# Patient Record
Sex: FEMALE | Race: WHITE | Employment: FULL TIME
[De-identification: names, ages, dates, MRNs, and addresses within clinical notes are randomized per-mention and may not be internally consistent; named-entity substitution may affect disease eponyms.]

---

## 2020-07-09 ENCOUNTER — TRANSCRIBE ORDERS (OUTPATIENT)
Dept: OTHER | Age: 60
End: 2020-07-09

## 2020-07-09 DIAGNOSIS — N94.89 ADNEXAL MASS: Primary | ICD-10-CM

## 2020-07-09 NOTE — TELEPHONE ENCOUNTER
ONCOLOGY INTAKE: Records Information      APPT INFORMATION:  Referring provider:  Dr.Kristen Sunni HAMLIN  Referring provider s clinic:  [facility]   Reason for visit/diagnosis:  adnexal mass  Has patient been notified of appointment date and time?: Yes    RECORDS INFORMATION:  Were the records received with the referral (via [employer])? Yes    Has patient been seen for any external appt for this diagnosis? Yes    If yes, where?  [facility]       Has patient had any imaging or procedures outside of [place] for this condition? Yes      If Yes, where?  [facility]       ADDITIONAL INFORMATION:  Patient is unable to do a video visit and will come in person.

## 2020-07-10 NOTE — TELEPHONE ENCOUNTER
Action    Action Taken 7/10/20: [place] [facility] [place] and now viewable to PACS  9:44 AM     RECORDS STATUS - ALL OTHER DIAGNOSIS      [place]ORDS [place]IV[place]: [facility]   DATE RECEIVED:    NOTES STATUS DETAILS   OFFICE NOTE from referring provider [facility] ED visit 7/8/20   OFFICE NOTE from medical oncologist     DISCHARGE SUMMARY from hospital     DISCHARGE REPORT from the ER [facility] 7/8/20   OPERATIVE REPORT     MEDICATION LIST     CLINICAL TRIAL TREATMENTS TO DATE     LABS     PATHOLOGY REPORTS     ANYTHING RELATED TO DIAGNOSIS Requested 7/10 : [facility]   GENONOMIC TESTING     TYPE:     IMAGING (NEED IMAGES & REPORT)     CT SCANS PACS 7[place]8/20[place] [facility]   MRI     MAMMO     ULTRASOUND PACS 7/820: [facility] [place]   PET

## 2020-07-16 ENCOUNTER — ONCOLOGY VISIT (OUTPATIENT)
Dept: ONCOLOGY | Facility: CLINIC | Age: 60
End: 2020-07-16
Attending: OBSTETRICS & GYNECOLOGY
Payer: COMMERCIAL

## 2020-07-16 ENCOUNTER — PRE VISIT (OUTPATIENT)
Dept: ONCOLOGY | Facility: CLINIC | Age: 60
End: 2020-07-16

## 2020-07-16 VITALS
TEMPERATURE: 98.6 F | WEIGHT: 198.9 LBS | RESPIRATION RATE: 16 BRPM | OXYGEN SATURATION: 97 % | DIASTOLIC BLOOD PRESSURE: 97 MMHG | SYSTOLIC BLOOD PRESSURE: 146 MMHG | HEART RATE: 96 BPM

## 2020-07-16 DIAGNOSIS — N94.89 ADNEXAL MASS: ICD-10-CM

## 2020-07-16 PROCEDURE — 99205 OFFICE O/P NEW HI 60 MIN: CPT | Mod: ZP | Performed by: OBSTETRICS & GYNECOLOGY

## 2020-07-16 PROCEDURE — G0463 HOSPITAL OUTPT CLINIC VISIT: HCPCS | Mod: ZF

## 2020-07-16 RX ORDER — CEFAZOLIN SODIUM 1 G/50ML
1 INJECTION, SOLUTION INTRAVENOUS SEE ADMIN INSTRUCTIONS
Status: CANCELLED | OUTPATIENT
Start: 2020-07-16

## 2020-07-16 RX ORDER — MULTIVITAMIN WITH IRON
1 TABLET ORAL DAILY
COMMUNITY

## 2020-07-16 RX ORDER — CEFAZOLIN SODIUM 2 G/50ML
2 SOLUTION INTRAVENOUS
Status: CANCELLED | OUTPATIENT
Start: 2020-07-16

## 2020-07-16 RX ORDER — ALBUTEROL SULFATE 90 UG/1
1-2 AEROSOL, METERED RESPIRATORY (INHALATION) EVERY 4 HOURS PRN
COMMUNITY
Start: 2020-02-25

## 2020-07-16 RX ORDER — LOSARTAN POTASSIUM 25 MG/1
25 TABLET ORAL EVERY MORNING
COMMUNITY
Start: 2020-04-29

## 2020-07-16 RX ORDER — SIMVASTATIN 40 MG
40 TABLET ORAL AT BEDTIME
COMMUNITY
Start: 2020-06-17

## 2020-07-16 RX ORDER — TRIAMTERENE/HYDROCHLOROTHIAZID 37.5-25 MG
1 TABLET ORAL EVERY MORNING
COMMUNITY
Start: 2020-06-24

## 2020-07-16 RX ORDER — IBUPROFEN 600 MG/1
600 TABLET, FILM COATED ORAL EVERY 6 HOURS PRN
COMMUNITY
Start: 2018-10-15 | End: 2020-07-20

## 2020-07-16 ASSESSMENT — PAIN SCALES - GENERAL: PAINLEVEL: NO PAIN (0)

## 2020-07-16 NOTE — NURSING NOTE
BLOOD CONSENT AND HYSTERECTOMY FORM COMPLETED     Pre Op Nurse Teaching Template    Relevant Diagnosis: Ovarian Mass    Teaching Topic: Exploratory laparotomy, BSO, total abdominal hysterectomy, possible cancer staging     Person(s) involved in teaching :  Patient  Alone   Motivation Level:  Asks Questions:    Yes      Eager to Learn:     Yes     Cooperative:          Yes    Receptive (willing. Able to accept information):    Yes      Patient and those who are listed above demonstrates understanding of the following:   Reason for the appointment, diagnosis and treatment plan:   Yes   Knowledge of proper use of medications and conditions for which they are ordered (with special attention to potential side effects or drug interactions): Yes   Which situations necessitate calling provider and whom to contact: Yes         Nutritional needs and diet plan:  Yes      Pain management techniques:     Yes, Pain Scale   Diet:   Yes, [facility] Diet Instructions    Teaching Concerns addressed: Yes    Infection Prevention:  Patient and those who are listed above demonstrate understanding of the following:  Surgical procedure site care taught:   Yes   Signs and symptoms of infection taught: Yes       Instructional Materials Used/Given:  The [place] Before [facility] Surgery Booklet  Hysterectomy Guidelines  Pain Assessment Tool   Home Care after Major Abdominal or Vaginal Surgery  Map  Accommodations Brochure  Phone numbers for [facility]  Copy of Surgical Consent    Comments:  NAVEEN Rueda RN

## 2020-07-16 NOTE — PROGRESS NOTES
GYNECOLOGIC  ONCOLOGY CONSULT    Referring provider:    Referred Self, MD  No address on file   RE: Annamarie Neal  : 1960  ANNAMARIA: 2020    CC:  Ovarian Mass, Elevated     HPI: Ms Annamarie Neal is a 60 year old   female who presents for consultation regarding new adnexal mass. She is here today alone for the consultation. Her son drove her to the appointment.     She reports recent ED at [facility] on 2020 visit for right sided back pain, on and off for more than one month. CT A/P showed large bilateral complex adnexal masses with multiple internal septations and nodularity measuring up to 13.4 cm.  50.8 on 2020. She reports that her pain has been ongoing for about one year but has worsened over the last month. She denies any vaginal bleeding, no changes in her bowel or bladder habits, no nausea/emesis, no lower extremity edema, and no difficulties eating or sleeping. She denies any abdominal bloating, no fevers or chills, and no chest pain or shortness of breath. She has a past medical history of HTN and HLD. Surgical history of Uterine polyp removal. She is up to date on mammogram, pap, colonoscopy. 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 She has multiple social issues going on at home  but she feels supported and safe at home. She is a current every day smoker, denies any alcohol use.       Work-up to date  20 Pelvic US   Uterus: measures 7.6 x 2.9 x 5.1 cm. No masses appreciated.  Endometrial stripe:  0.32 cm    Right ovary:  17.8 x 7.2 x 10.9 cm. Multiple large complex solid and cystic masses with internal septations and echogenic debris is demonstrated on same-day CT extending beyond visualization into the right upper abdomen. Normal low-resistance arterial waveforms.    Left ovary:  8.3 x 5.1 x 5.4 cm. Multiple large complex solid and cystic masses with internal septations and echogenic contents as demonstrated on same-day CT. Normal low-resistance arterial  waveforms.    Urinary Bladder:  decompressed and not well visualized.      20 CT scan  IMPRESSION  IMPRESSION:   1. Large bilateral complex adnexal masses with multiple internal septations and nodularity measuring up to 13.4 cm. These demonstrate intermediate fluid signal which may represent hemorrhagic vs proteinaceous contents. The complex right adnexal mass extends into the right upper quadrant abutting multiple loops of bowel. Findings concerning for neoplasm although there is no distinct iodine uptake appreciated. Recommend ob/gyn consult, and consider MRI.    2. Scattered sub-4 mm posterior bibasilar pleural-based ground glass nodules.    3. Additional incidental findings as described in the body of the report including a left adrenal adenoma.  20 CEA  0.6;  50.8    OBGYN history and Health Maintenance:    Last Pap Smear: 2019  Last Mammogram: 2019  Last Colonoscopy:  Negative 3/20 fecal occult yearly    Review of Systems:  Systemic:No weight changes.    Skin : No skin changes or new lesions.   Eye : No changes in vision.   Pulmonary: No cough or SOB.   Cardiovascular: No CP or palpitations  Gastrointestinal : No diarrhea, constipation, +abdominal pain. Bowel habits normal.   Genitourinary: No dysuria, urgency or bleeding  Psychiatric: No depression or anxiety  Hematologic : No palpable lymph nodes.   Endocrine : No hot flashes. No heat/cold intolerance.      Neurological: No headaches, no numbness.     Past Medical History:   Diagnosis Date     Adnexal mass 2020     Dyslipidemia, goal LDL below 130 10/2/2013     GERD (gastroesophageal reflux disease)      Hypertension 2020     Prediabetes 3/22/2019     Smoker 2011     Past Surgical History:   Procedure Laterality Date     SURGICAL PATHOLOGY EXAM       TONSILLECTOMY           Current Outpatient Medications   Medication Sig Dispense Refill     albuterol (PROAIR HFA/PROVENTIL HFA/VENTOLIN HFA) 108 (90 Base) MCG/ACT  inhaler Inhale 1-2 puffs into the lungs every 4 hours as needed       Ascorbic Acid (VITAMIN C PO) Take 1 tablet by mouth daily       CALCIUM-VITAMIN D PO Take 1 tablet by mouth daily       ibuprofen (ADVIL/MOTRIN) 600 MG tablet Take 600 mg by mouth every 6 hours as needed       losartan (COZAAR) 25 MG tablet Take 25 mg by mouth daily       nicotine polacrilex (NICORETTE) 4 MG gum TK 1 T PO Q H PRF NICOTINE CRAVINGS       simvastatin (ZOCOR) 40 MG tablet Take 40 mg by mouth daily       triamterene-HCTZ (MAXZIDE-25) 37.5-25 MG tablet Take 1 tablet by mouth daily       vitamin (B COMPLEX-C) tablet Take 1 tablet by mouth daily       naloxone (NARCAN) 4 MG/0.1ML nasal spray After removing nasal spray from box, gently insert the tip of the nozzle into either nostril. Press the plunger firmly to give dose; remove device from nose. If no reaction in 2-3 minutes, give a second dose.           Allergies   Allergen Reactions     Lisinopril      Other reaction(s): Cough       Social History:  Social History     Tobacco Use     Smoking status: Current Every Day Smoker     Smokeless tobacco: Never Used   Substance Use Topics     Alcohol use: Not on file     Work: RN in Disaster Unit at [employer] [place]  Ethnicity identification:   Preferred language: English  Lives at home with: Son    Family History:   Family History   Problem Relation Age of Onset     Melanoma Mother      Breast Cancer Mother      Hypertension Mother      Hyperlipidemia Mother      Pancreatic Cancer Father      Diabetes Sister      Diabetes Sister      Hypertension Sister            Physical Exam:     BP (!) 146/97   Pulse 96   Temp 98.6  F (37  C) (Oral)   Resp 16   Wt 90.2 kg (198 lb 14.4 oz)   SpO2 97%   Breastfeeding No   There is no height or weight on file to calculate BMI.    General: Alert and oriented, no acute distress  Psych: Mood stable  Cardiovascular: RRR, no murmors  Pulmonary: Lungs clear . Normal respiratory effort  GI: No distention. No  masses. No hernia.   Lymph: No enlarge lymph nodes in neck or groin  : Normal external genitalia. Cervix is of normal color without lesion. The os is closed. There is no bleeding noted. Uterus is noted to be of normal size and nontender. No cervical motion tenderness is seen. No masses are palpated Adnexa is with right sided fullness and mild tenderness.    Assessment:    Annamarie Neal is a 60 year old woman with a new diagnosis of bilateral complex adnexal masses and elevated . She presents for surgical evaluation.     Discussed the complexity of the ovarian masses in addition to elevated  increases the risk of invasive ovarian cancer or possible borderline ovarian cancer. Ct scan findings reviewed in detail with patient and no sign of upper abdominal mass noted. Treatment options reviewed including surgery and based on national guidelines the optimal recommendation is an open surgical approach. Alternative to surgery reviewed although not recommended for this patient.    Plan:     1.)   Bilateral complex adnexal masses: Recomendation to proceed with surgery. Planned surgery includes: Exploratory Laparotomy, Total Abdominal Hysterectomy, Bilateral Salpingo-Oophorectomy, Possible Cancer Staging. Risks and benefits of surgery reviewed and surgical consent obtained.     2.) Genetic risk factors were assessed: pending the final pathology    3.) Labs and/or tests ordered include:  PAC and COVID testing.    4.) Smoker- patient is not ready to consider smoking cessation at this time         Sushma Camacho M.D., MPH,  F.A.C.O.G.  Professor  Department of Ob/Gyn and Women's Health  Division of Gynecologic Oncology  [facility]  [phone]      Keren Carlson, MSN, [facility]  Women's Health Nurse Practitioner  Division of Gynecologic Oncology    A total of 60 minutes was spent with the patient, 50% of the time of which were spent in counseling the patient and/or treatment  planning.

## 2020-07-16 NOTE — LETTER
2020         RE: Annamarie Neal  [address] [address]        Dear Colleague,    Thank you for referring your patient, Annamarie Neal, to the [facility]. Please see a copy of my visit note below.    GYNECOLOGIC  ONCOLOGY CONSULT    Referring provider:    Referred Self, MD  No address on file   RE: Annamarie Neal  : 1960  ANNAMARIA: 2020    CC:  Ovarian Mass, Elevated     HPI: Ms Annamarie Neal is a 60 year old   female who presents for consultation regarding new adnexal mass. She is here today alone for the consultation. Her son drove her to the appointment.     She reports recent ED at [facility] on 2020 visit for right sided back pain, on and off for more than one month. CT A/P showed large bilateral complex adnexal masses with multiple internal septations and nodularity measuring up to 13.4 cm.  50.8 on 2020. She reports that her pain has been ongoing for about one year but has worsened over the last month. She denies any vaginal bleeding, no changes in her bowel or bladder habits, no nausea/emesis, no lower extremity edema, and no difficulties eating or sleeping. She denies any abdominal bloating, no fevers or chills, and no chest pain or shortness of breath. She has a past medical history of HTN and HLD. Surgical history of Uterine polyp removal. She is up to date on mammogram, pap, colonoscopy. 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 She has multiple social issues going on at home  but she feels supported and safe at home. She is a current every day smoker, denies any alcohol use.       Work-up to date  20 Pelvic US   Uterus: measures 7.6 x 2.9 x 5.1 cm. No masses appreciated.  Endometrial stripe:  0.32 cm    Right ovary:  17.8 x 7.2 x 10.9 cm. Multiple large complex solid and cystic masses with internal septations and echogenic debris is demonstrated on same-day CT extending beyond visualization into the right upper  abdomen. Normal low-resistance arterial waveforms.    Left ovary:  8.3 x 5.1 x 5.4 cm. Multiple large complex solid and cystic masses with internal septations and echogenic contents as demonstrated on same-day CT. Normal low-resistance arterial waveforms.    Urinary Bladder:  decompressed and not well visualized.      20 CT scan  IMPRESSION  IMPRESSION:   1. Large bilateral complex adnexal masses with multiple internal septations and nodularity measuring up to 13.4 cm. These demonstrate intermediate fluid signal which may represent hemorrhagic vs proteinaceous contents. The complex right adnexal mass extends into the right upper quadrant abutting multiple loops of bowel. Findings concerning for neoplasm although there is no distinct iodine uptake appreciated. Recommend ob/gyn consult, and consider MRI.    2. Scattered sub-4 mm posterior bibasilar pleural-based ground glass nodules.    3. Additional incidental findings as described in the body of the report including a left adrenal adenoma.  20 CEA  0.6;  50.8    OBGYN history and Health Maintenance:    Last Pap Smear: 2019  Last Mammogram: 2019  Last Colonoscopy:  Negative 3/20 fecal occult yearly    Review of Systems:  Systemic:No weight changes.    Skin : No skin changes or new lesions.   Eye : No changes in vision.   Pulmonary: No cough or SOB.   Cardiovascular: No CP or palpitations  Gastrointestinal : No diarrhea, constipation, +abdominal pain. Bowel habits normal.   Genitourinary: No dysuria, urgency or bleeding  Psychiatric: No depression or anxiety  Hematologic : No palpable lymph nodes.   Endocrine : No hot flashes. No heat/cold intolerance.      Neurological: No headaches, no numbness.     Past Medical History:   Diagnosis Date     Adnexal mass 2020     Dyslipidemia, goal LDL below 130 10/2/2013     GERD (gastroesophageal reflux disease)      Hypertension 2020     Prediabetes 3/22/2019     Smoker 2011     Past  Surgical History:   Procedure Laterality Date     SURGICAL PATHOLOGY EXAM       TONSILLECTOMY           Current Outpatient Medications   Medication Sig Dispense Refill     albuterol (PROAIR HFA/PROVENTIL HFA/VENTOLIN HFA) 108 (90 Base) MCG/ACT inhaler Inhale 1-2 puffs into the lungs every 4 hours as needed       Ascorbic Acid (VITAMIN C PO) Take 1 tablet by mouth daily       CALCIUM-VITAMIN D PO Take 1 tablet by mouth daily       ibuprofen (ADVIL/MOTRIN) 600 MG tablet Take 600 mg by mouth every 6 hours as needed       losartan (COZAAR) 25 MG tablet Take 25 mg by mouth daily       nicotine polacrilex (NICORETTE) 4 MG gum TK 1 T PO Q H PRF NICOTINE CRAVINGS       simvastatin (ZOCOR) 40 MG tablet Take 40 mg by mouth daily       triamterene-HCTZ (MAXZIDE-25) 37.5-25 MG tablet Take 1 tablet by mouth daily       vitamin (B COMPLEX-C) tablet Take 1 tablet by mouth daily       naloxone (NARCAN) 4 MG/0.1ML nasal spray After removing nasal spray from box, gently insert the tip of the nozzle into either nostril. Press the plunger firmly to give dose; remove device from nose. If no reaction in 2-3 minutes, give a second dose.           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    General: Alert and oriented, no acute distress  Psych: Mood stable  Cardiovascular: RRR, no murmors  Pulmonary: Lungs clear . Normal respiratory effort  GI: No distention. No masses. No hernia.   Lymph: No enlarge lymph nodes in neck or groin  : Normal external genitalia. Cervix is of normal color without lesion. The os is closed. There is no bleeding noted. Uterus is noted to be of normal size and nontender. No cervical motion tenderness is seen. No masses are palpated Adnexa is with right sided fullness and mild tenderness.    Assessment:    Annamarie Neal is a 60 year old woman with a new diagnosis of bilateral complex adnexal masses and elevated . She presents for surgical evaluation.     Discussed the complexity of the ovarian masses in addition to elevated  increases the risk of invasive ovarian cancer or possible borderline ovarian cancer. Ct scan findings reviewed in detail with patient and no sign of upper abdominal mass noted. Treatment options reviewed including surgery and based on national guidelines the optimal recommendation is an open surgical approach. Alternative to surgery reviewed although not recommended for this patient.    Plan:     1.)   Bilateral complex adnexal masses: Recomendation to proceed with surgery. Planned surgery includes: Exploratory Laparotomy, Total Abdominal Hysterectomy, Bilateral Salpingo-Oophorectomy, Possible Cancer Staging. Risks and benefits of surgery reviewed and surgical consent obtained.     2.) Genetic risk factors were assessed: pending the final pathology    3.) Labs and/or tests ordered include:  PAC and COVID testing.    4.) Smoker- patient is not ready to consider smoking cessation at this time         Sushma Camacho M.D., MPH,  F.A.C.O.G.  Professor  Department of Ob/Gyn and Women's Health  Division of Gynecologic Oncology  [facility]  [place]  [phone]      Keren Carlson, MSN, [facility]  Women's Health Nurse Practitioner  Division of Gynecologic Oncology    A total of 60 minutes was spent with the patient, 50% of the time of which were spent in counseling the patient and/or treatment planning.              Again, thank you for allowing me to participate in the care of your patient.        Sincerely,        Sushma Camacho MD

## 2020-07-16 NOTE — NURSING NOTE
Oncology Rooming Note    July 16, 2020 12:53 PM   Annamarie Neal is a 60 year old female who presents for:    Chief Complaint   Patient presents with     New Patient     ADNEXAL MASS      Initial Vitals: BP (!) 146/97   Pulse 96   Temp 98.6  F (37  C) (Oral)   Resp 16   Wt 90.2 kg (198 lb 14.4 oz)   SpO2 97%   Breastfeeding No  There is no height or weight on file to calculate BMI. There is no height or weight on file to calculate BSA.  No Pain (0) Comment: Data Unavailable   No LMP recorded. Patient is postmenopausal.  Allergies reviewed: Yes  Medications reviewed: Yes    Medications: Medication refills not needed today.  Pharmacy name entered into [facility]: [id number] [address] [place] [address]    Clinical concerns: No new concerns today  Dr Camacho was NOT notified.      Cindy Harris

## 2020-07-17 ENCOUNTER — TELEPHONE (OUTPATIENT)
Dept: ONCOLOGY | Facility: CLINIC | Age: 60
End: 2020-07-17

## 2020-07-17 DIAGNOSIS — Z11.59 ENCOUNTER FOR SCREENING FOR OTHER VIRAL DISEASES: Primary | ICD-10-CM

## 2020-07-17 PROBLEM — I10 HYPERTENSION: Status: ACTIVE | Noted: 2020-07-17

## 2020-07-17 PROBLEM — R73.03 PREDIABETES: Status: ACTIVE | Noted: 2019-03-22

## 2020-07-17 NOTE — TELEPHONE ENCOUNTER
Surgery is scheduled with Dr. Camacho on 7/22 at [place].  Scheduled per surgeon.    COVID: 7/20 at 2:30 PM  PAC: 7/20 3 PM  POST-OP: 8/13 at 8 AM    The RN completed the education regarding the surgery.     The surgery packet was provided that contains surgical instructions and a map.     Pt is aware that they will be contacted to schedule a COVID-19 test within 3 days of surgery.    They are aware that they will get their finalized times at their appointment with PAC.    I contacted the patient and was able to confirm the scheduled dates.

## 2020-07-17 NOTE — TELEPHONE ENCOUNTER
FUTURE VISIT INFORMATION      SURGERY INFORMATION:    Date: 20    Location: uu or    Surgeon:  Sushma Camacho MD     Anesthesia Type:  Combined General with Block     Procedure: EXPLORATORY LAPAROTOMY, BILATERAL SALPINGO-OOPHORECTOMY, TOTAL ABDOMINAL HYSTERECTOMY, POSSIBLE CANCER STAGING  WITH LYMPHADENECTOMY AND NEOPLASM DEBULKING     Consult: ov     RECORDS REQUESTED FROM:       Primary Care Provider: [facility]    Most recent EKG+ Tracin12- [facility]

## 2020-07-20 ENCOUNTER — OFFICE VISIT (OUTPATIENT)
Dept: SURGERY | Facility: CLINIC | Age: 60
End: 2020-07-20
Payer: COMMERCIAL

## 2020-07-20 ENCOUNTER — ANESTHESIA EVENT (OUTPATIENT)
Dept: SURGERY | Facility: CLINIC | Age: 60
DRG: 742 | End: 2020-07-20
Payer: COMMERCIAL

## 2020-07-20 ENCOUNTER — PRE VISIT (OUTPATIENT)
Dept: SURGERY | Facility: CLINIC | Age: 60
End: 2020-07-20

## 2020-07-20 VITALS
DIASTOLIC BLOOD PRESSURE: 88 MMHG | HEART RATE: 97 BPM | OXYGEN SATURATION: 96 % | SYSTOLIC BLOOD PRESSURE: 132 MMHG | BODY MASS INDEX: 33.8 KG/M2 | HEIGHT: 64 IN | RESPIRATION RATE: 16 BRPM | WEIGHT: 198 LBS | TEMPERATURE: 98.7 F

## 2020-07-20 DIAGNOSIS — N94.89 ADNEXAL MASS: ICD-10-CM

## 2020-07-20 DIAGNOSIS — Z01.818 PREOP EXAMINATION: Primary | ICD-10-CM

## 2020-07-20 DIAGNOSIS — Z11.59 ENCOUNTER FOR SCREENING FOR OTHER VIRAL DISEASES: ICD-10-CM

## 2020-07-20 ASSESSMENT — LIFESTYLE VARIABLES: TOBACCO_USE: 1

## 2020-07-20 ASSESSMENT — MIFFLIN-ST. JEOR: SCORE: 1448.12

## 2020-07-20 ASSESSMENT — PAIN SCALES - GENERAL: PAINLEVEL: NO PAIN (0)

## 2020-07-20 NOTE — PATIENT INSTRUCTIONS
Preparing for Your Surgery      Name:  Annamarie Neal   MRN:  [id number]   :  1960   Today's Date:  2020     Arriving for surgery:  Surgery date:  2020  Arrival time:  6:30AM    Restrictions due to COVID 19:  Patients are allowed one visitor in the pre-op period  All visitors must wear a mask  No visitors under 18  No ill visitors   parking is not available     Please come to:       [facility]   [address] [place]  [address]       -    Please proceed to the Surgery Lounge on the 3rd floor. [phone]?     - ?If you are in need of directions, wheelchair or escort please stop at the Information Desk in the lobby.  Inform the information person that you are here for surgery; a wheelchair and escort will be provided to the Surgery Lounge .?     What can I eat or drink?  -  You may eat and drink normally for up to 8 hours before your surgery. (Until 2020, 1AM)  -  You may have clear liquids until 2 hours before surgery. (Until 2020, 6:30AM)  Examples of clear liquids:  Water  Clear broth  Juices (apple, white grape, white cranberry  and cider) without pulp  Noncarbonated, powder based beverages  (lemonade and Greyson-Aid)  Sodas (Sprite, 7-Up, ginger ale and seltzer)  Coffee or tea (without milk or cream)  Gatorade    -  No Alcohol for at least 24 hours before surgery     Which medicines can I take?    Hold Aspirin for 7 days before surgery.   Hold Multivitamins for 7 days before surgery.  Hold Supplements for 7 days before surgery.  Hold Ibuprofen (Advil, Motrin) for 1 day before surgery--unless otherwise directed by surgeon.  Hold Naproxen (Aleve) for 4 days before surgery.    -  DO NOT take these medications the day of surgery:    Losartan(Cozaar)   Nicorette gum    Triamterene-Hydochlorothiazide    -  PLEASE TAKE these medications the day of surgery:    Albuterol Inhaler as needed and bring the day of surgery    How do I prepare  myself?  - Please take 2 showers before surgery using Scrubcare or Hibiclens soap.    Use this soap only from the neck to your toes.     Leave the soap on your skin for one minute--then rinse thoroughly.      You may use your own shampoo and conditioner; no other hair products.   - Please remove all jewelry and body piercings.  - No lotions, deodorants or fragrance.  - No makeup or fingernail polish.   - Bring your ID and insurance card.      Questions or Concerns:    - For any questions regarding the day of surgery or your hospital stay, please contact the Pre Admission Nursing Office at [phone].       - If you have health changes between today and your surgery please call your surgeon.       For questions after surgery please call your surgeons office.           AFTER YOUR SURGERY  Breathing exercises   Breathing exercises help you recover faster. Take deep breaths and let the air out slowly. This will:     Help you wake up after surgery.    Help prevent complications like pneumonia.  Preventing complications will help you go home sooner.   We may give you a breathing device (incentive spirometer) to encourage you to breathe deeply.   Nausea and vomiting   You may feel sick to your stomach after surgery; if so, let your nurse know.    Pain control:  After surgery, you may have pain. Our goal is to help you manage your pain. Pain medicine will help you feel comfortable enough to do activities that will help you heal.  These activities may include breathing exercises, walking and physical therapy.   To help your health care team treat your pain we will ask: 1) If you have pain  2) where it is located 3) describe your pain in your words  Methods of pain control include medications given by mouth, vein or by nerve block for some surgeries.  Sequential Compression Device (SCD):  You may need to wear SCD S (also called pneumo boots)on your legs or feet. These are wraps connected to a machine that pumps in air and  releases it. The repeated pumping helps prevent blood clots from forming.

## 2020-07-20 NOTE — H&P
Pre-Operative H & P     CC:  Preoperative exam to assess for increased cardiopulmonary risk while undergoing surgery and anesthesia.    Date of Encounter: 7/20/2020  Primary Care Physician:  Keshawn Loomis  Reason for visit: Adnexal mass [N94.89]  HPI  Annamarie Neal is a 60 year old female who presents for pre-operative H & P in preparation for EXPLORATORY LAPAROTOMY, BILATERAL SALPINGO-OOPHORECTOMY, TOTAL ABDOMINAL HYSTERECTOMY, POSSIBLE CANCER STAGING WITH LYMPHADENECTOMY AND NEOPLASM DEBULKING with Dr. Camacho on 7/22/20 at [facility]. History is obtained from the patient and medical records.    Patient who was recently evaluated by Dr. Camacho after ED visit on 7/8/20 for persistent and worsening right sided back pain. A CT scan showed large bilateral complex adnexal masses with multiple internal septations and nodularity measuring up to 13.4 cm.  50.8 on 7/8/2020. She was counseled for above procedure.     Her history is otherwise significant for HLD, HTN, current smoking, and GERD.    Past Medical History  Past Medical History:   Diagnosis Date     Adnexal mass 07/16/2020     Dyslipidemia, goal LDL below 130 10/2/2013     GERD (gastroesophageal reflux disease)      Hypertension 7/17/2020     Prediabetes 3/22/2019     Smoker 9/23/2011       Past Surgical History  Past Surgical History:   Procedure Laterality Date     SURGICAL PATHOLOGY EXAM       TONSILLECTOMY         Hx of Blood transfusions/reactions: Denies.      Hx of abnormal bleeding or anti-platelet use: Denies.     Menstrual history: No LMP recorded. Patient is postmenopausal.    Steroid use in the last year: Denies.     Personal or FH with difficulty with Anesthesia:  Denies.     Prior to Admission Medications  Current Outpatient Medications   Medication Sig Dispense Refill     albuterol (PROAIR HFA/PROVENTIL HFA/VENTOLIN HFA) 108 (90 Base) MCG/ACT inhaler Inhale 1-2 puffs into the lungs every 4 hours  as needed       losartan (COZAAR) 25 MG tablet Take 25 mg by mouth every morning        nicotine polacrilex (NICORETTE) 4 MG gum Take 4 mg by mouth as needed (Pt last dose 7.15.2020)        simvastatin (ZOCOR) 40 MG tablet Take 40 mg by mouth At Bedtime        triamterene-HCTZ (MAXZIDE-25) 37.5-25 MG tablet Take 1 tablet by mouth every morning        Ascorbic Acid (VITAMIN C PO) Take 1 tablet by mouth daily Last dose 7.17.2020       CALCIUM-VITAMIN D PO Take 1 tablet by mouth daily Last dose 7.17.2020       naloxone (NARCAN) 4 MG/0.1ML nasal spray After removing nasal spray from box, gently insert the tip of the nozzle into either nostril. Press the plunger firmly to give dose; remove device from nose. If no reaction in 2-3 minutes, give a second dose.       vitamin (B COMPLEX-C) tablet Take 1 tablet by mouth daily Last dose 7.17.2020         Allergies  Allergies   Allergen Reactions     Tylenol With Codeine [Acetaminophen-Codeine] Nausea and Vomiting     Lisinopril      Other reaction(s): Cough       Social History  Social History     Socioeconomic History     Marital status:      Spouse name: Not on file     Number of children: Not on file     Years of education: Not on file     Highest education level: Not on file   Occupational History     Occupation: RN   Social Needs     Financial resource strain: Not on file     Food insecurity     Worry: Not on file     Inability: Not on file     Transportation needs     Medical: Not on file     Non-medical: Not on file   Tobacco Use     Smoking status: Current Every Day Smoker     Packs/day: 0.50     Years: 23.00     Pack years: 11.50     Types: Cigarettes     Smokeless tobacco: Never Used     Tobacco comment: Now 5 cigs daily   Substance and Sexual Activity     Alcohol use: Yes     Drug use: Not on file     Sexual activity: Not on file   Lifestyle     Physical activity     Days per week: Not on file     Minutes per session: Not on file     Stress: Not on file    Relationships     Social connections     Talks on phone: Not on file     Gets together: Not on file     Attends Mandaen service: Not on file     Active member of club or organization: Not on file     Attends meetings of clubs or organizations: Not on file     Relationship status: Not on file     Intimate partner violence     Fear of current or ex partner: Not on file     Emotionally abused: Not on file     Physically abused: Not on file     Forced sexual activity: Not on file   Other Topics Concern     Not on file   Social History Narrative     Not on file       Family History  Family History   Problem Relation Age of Onset     Melanoma Mother      Breast Cancer Mother      Hypertension Mother      Hyperlipidemia Mother      Pancreatic Cancer Father      Diabetes Sister      Diabetes Sister      Hypertension Sister        Preop Vitals    BP Readings from Last 3 Encounters:   07/16/20 (!) 146/97    Pulse Readings from Last 3 Encounters:   07/16/20 96      Resp Readings from Last 3 Encounters:   07/16/20 16    SpO2 Readings from Last 3 Encounters:   07/16/20 97%      Temp Readings from Last 1 Encounters:   07/16/20 98.6  F (37  C) (Oral)    Ht Readings from Last 1 Encounters:   No data found for Ht      Wt Readings from Last 1 Encounters:   07/16/20 90.2 kg (198 lb 14.4 oz)    There is no height or weight on file to calculate BMI.     ROS/MED HISTORY    The complete review of systems is negative other than noted in the HPI or here.       ENT/Pulmonary: Comment: Albuterol use associated with GERD and coughing at night. Occ use.    (+)RAJESH risk factors hypertension, tobacco use, Current use 5 cigs daily packs/day  , . .    Neurologic:  - neg neurologic ROS     Cardiovascular:     (+) Dyslipidemia, hypertension-range: 140s/80-90, ---. : . . . :. . Previous cardiac testing date:results:date: results:ECG reviewed date:2012 results:SR date: results:         (-) taking anticoagulants/antiplatelets   METS/Exercise  "Tolerance:  >4 METS   Hematologic:  - neg hematologic  ROS       Musculoskeletal:  - neg musculoskeletal ROS       GI/Hepatic:     (+) GERD Other,       Renal/Genitourinary:  - ROS Renal section negative       Endo:  - neg endo ROS       Psychiatric:  - neg psychiatric ROS       Infectious Disease:  - neg infectious disease ROS       Malignancy:   (+) Malignancy   Adnexal masses        Other:    (+) C-spine cleared: N/A, no H/O Chronic Pain,no other significant disability            PHYSICAL EXAM:   Mental Status/Neuro: A/A/O; Age Appropriate   Airway: Facies: Feasible  Mallampati: I  Mouth/Opening: Full  TM distance: > 6 cm  Neck ROM: Full   Respiratory: Auscultation: CTAB     Resp. Rate: Normal     Resp. Effort: Normal      CV: Rhythm: Regular  Heart: Normal Sounds  Edema: None   Comments:      Dental: Normal Dentition            Temp: 98.7  F (37.1  C) Temp src: Oral BP: 132/88 Pulse: 97   Resp: 16 SpO2: 96 %         198 lbs 0 oz  5' 4\"   Body mass index is 33.99 kg/m .       Physical Exam  Constitutional: Awake, alert, cooperative, no apparent distress, and appears stated age.  Eyes: Pupils equal, round and reactive to light, extra ocular muscles intact, sclera clear, conjunctiva normal.  HENT: Normocephalic, oral pharynx with moist mucus membranes, good dentition. No goiter appreciated.   Respiratory: Clear to auscultation bilaterally, no crackles or wheezing. No cough or obvious dyspnea.  Cardiovascular: Regular rate and rhythm, normal S1 and S2, and no murmur noted.  Carotids +2, no bruits. No edema. Palpable pulses to radial  DP and PT arteries.   GI: Normal bowel sounds, soft, non-distended, non-tender, no masses palpated, no hepatosplenomegaly.   Lymph/Hematologic: No cervical lymphadenopathy and no supraclavicular lymphadenopathy.  Genitourinary:  Deferred.   Skin: Warm and dry.  No rashes at anticipated surgical site.   Musculoskeletal: Full ROM of neck. There is no redness, warmth, or swelling of the " joints. Gross motor strength is normal.    Neurologic: Awake, alert, oriented to name, place and time. Cranial nerves II-XII are grossly intact. Gait is normal.   Neuropsychiatric: Mildly anxious, cooperative. Normal affect.     Labs: (personally reviewed) OSH 7/8/20   WBC 9.58  Hgb 13.8  hematocrit 40.7  Platelets 291  Na 142  K 3.5  Cl 106  Glu 132  Cr 0.82  GFR 89  LFTs normal  3/22/19 A1c 6.1  EKG: Personally reviewed 2012 Sinus rhythm    7/8/20 Pelvis US  Impression:   Multiple bilateral large complex adnexal solid and cystic masses with internal septations and echogenic components as demonstrated on same-day CT exam. Recommend GYN consultation, and consider MRI.    7/8/20 CT abd/pelvis  IMPRESSION:   1. Large bilateral complex adnexal masses with multiple internal septations and nodularity measuring up to 13.4 cm. These demonstrate intermediate fluid signal which may represent hemorrhagic vs proteinaceous contents. The complex right adnexal mass extends into the right upper quadrant abutting multiple loops of bowel. Findings concerning for neoplasm although there is no distinct iodine uptake appreciated. Recommend ob/gyn consult, and consider MRI.    2. Scattered sub-4 mm posterior bibasilar pleural-based ground glass nodules.    3. Additional incidental findings as described in the body of the report including a left adrenal adenoma.    Imaging reviewed by this provider    Outside records reviewed from: Care Everywhere    ASSESSMENT and PLAN  Annamarie Neal is a 60 year old female scheduled to undergo  EXPLORATORY LAPAROTOMY, BILATERAL SALPINGO-OOPHORECTOMY, TOTAL ABDOMINAL HYSTERECTOMY, POSSIBLE CANCER STAGING WITH LYMPHADENECTOMY AND NEOPLASM DEBULKING with Dr. Camacho on 7/22/20. She has the following specific operative considerations:   - RCRI : 0.9% risk of major adverse cardiac event.   - Anesthesia considerations:  Refer to PAC assessment in anesthesia records  - VTE risk: 3%  - RAJESH # of risks 3/8 =  Intermediate risk  - Risk of PONV score = 2.  If > 2, anti-emetic intervention recommended.    --Adnexal masses with above procedure now planned.   --HLD. Simvastatin at HS. HTN. Will hold losartan and Maxzide on DOS. No other cardiac history, symptoms or meds. EKG above. Good activity tolerance.   --Current smoker 1/2 PPD X 23 years. Using nicotine gum, now down to 5 cigs daily. Denies pulmonary symptoms.   --Occasional GERD. Uses albuterol inhaler for nighttime coughing related to GERD.  --Pain management. Discussed possibility of nerve block if appropriate for patient's procedure. Final counseling and decisions by regional team on DOS.  --Mildly anxious today.     Type and screen drawn today    Arrival time, NPO, shower and medication instructions provided by nursing staff today. Preparing For Your Surgery handout given.      NOE Diaz CNS  Preoperative Assessment Center  [facility]  [facility] and [facility]  Phone: [phone]  Fax: [phone]

## 2020-07-20 NOTE — ANESTHESIA PREPROCEDURE EVALUATION
Anesthesia Pre-Procedure Evaluation    Patient: Annamarie Neal   MRN:     [id number] Gender:   female   Age:    60 year old :      1960        Preoperative Diagnosis: Adnexal mass [N94.89]   Procedure(s):  EXPLORATORY LAPAROTOMY, BILATERAL SALPINGO-OOPHORECTOMY, TOTAL ABDOMINAL HYSTERECTOMY, POSSIBLE CANCER STAGING WITH LYMPHADENECTOMY AND NEOPLASM DEBULKING     LABS:  CBC: No results found for: WBC, HGB, HCT, PLT  BMP: No results found for: NA, POTASSIUM, CHLORIDE, CO2, BUN, CR, GLC  COAGS: No results found for: PTT, INR, FIBR  POC: No results found for: BGM, HCG, HCGS  OTHER: No results found for: PH, LACT, A1C, RADHIKA, PHOS, MAG, ALBUMIN, PROTTOTAL, ALT, AST, GGT, ALKPHOS, BILITOTAL, BILIDIRECT, LIPASE, AMYLASE, JAS, TSH, T4, T3, CRP, SED     Preop Vitals    BP Readings from Last 3 Encounters:   20 (!) 146/97    Pulse Readings from Last 3 Encounters:   20 96      Resp Readings from Last 3 Encounters:   20 16    SpO2 Readings from Last 3 Encounters:   20 97%      Temp Readings from Last 1 Encounters:   20 98.6  F (37  C) (Oral)    Ht Readings from Last 1 Encounters:   No data found for Ht      Wt Readings from Last 1 Encounters:   20 90.2 kg (198 lb 14.4 oz)    There is no height or weight on file to calculate BMI.     LDA:        Past Medical History:   Diagnosis Date     Adnexal mass 2020     Dyslipidemia, goal LDL below 130 10/2/2013     Hypertension 2020     Prediabetes 3/22/2019     Smoker 2011      Past Surgical History:   Procedure Laterality Date     TONSILLECTOMY        Allergies   Allergen Reactions     Lisinopril      Other reaction(s): Cough        Anesthesia Evaluation     . Pt has had prior anesthetic. Type: General    No history of anesthetic complications          ROS/MED HX    ENT/Pulmonary: Comment: Albuterol use associated with GERD and coughing at night. Occ use.    (+)RAJESH risk factors hypertension, tobacco use, Current use 5 cigs daily  packs/day  , . .    Neurologic:  - neg neurologic ROS     Cardiovascular:     (+) Dyslipidemia, hypertension-range: 140s/80-90, ---. : . . . :. . Previous cardiac testing date:results:date: results:ECG reviewed date:2012 results:SR date: results:         (-) taking anticoagulants/antiplatelets   METS/Exercise Tolerance:  >4 METS   Hematologic:  - neg hematologic  ROS       Musculoskeletal:  - neg musculoskeletal ROS       GI/Hepatic:     (+) GERD Other,       Renal/Genitourinary:  - ROS Renal section negative       Endo:  - neg endo ROS       Psychiatric:  - neg psychiatric ROS       Infectious Disease:  - neg infectious disease ROS       Malignancy:   (+) Malignancy   Adnexal masses        Other:    (+) C-spine cleared: N/A, no H/O Chronic Pain,no other significant disability                        PHYSICAL EXAM:   Mental Status/Neuro: A/A/O; Age Appropriate   Airway: Facies: Feasible  Mallampati: I  Mouth/Opening: Full  TM distance: > 6 cm  Neck ROM: Full   Respiratory: Auscultation: CTAB     Resp. Rate: Normal     Resp. Effort: Normal      CV: Rhythm: Regular  Heart: Normal Sounds  Edema: None   Comments:      Dental: Normal Dentition                Assessment:   ASA SCORE: 3    H&P: History and physical reviewed and following examination; no interval change.     Smoking Status:  Active Smoker       - patient smoked on day of surgery       - Patient was counseled regarding increased Infection Risk with same day smoking.   NPO Status: NPO Appropriate     Plan:   Anes. Type:  General   Pre-Medication: None   Induction:  IV (Standard)   Airway: ETT; Oral   Access/Monitoring: PIV; 2nd PIV   Maintenance: Balanced     Postop Plan:   Postop Pain: Opioids  Postop Sedation/Airway: Not planned  Disposition: Inpatient/Admit     PONV Management:   Adult Risk Factors: Female, Postop Opioids   Prevention: Ondansetron, Dexamethasone     CONSENT: Direct conversation   Plan and risks discussed with: Patient   Blood Products:  Consented (ALL Blood Products)                PAC Discussion and Assessment    ASA Classification: 3  Case is suitable for: [employer]  Anesthetic techniques and relevant risks discussed: GA and GA with regional block for post-op pain control  Invasive monitoring and risk discussed: No  Types:   Possibility and Risk of blood transfusion discussed: Yes  NPO instructions given:   Additional anesthetic preparation and risks discussed:   Needs early admission to pre-op area:   Other:     PAC Resident/NP Anesthesia Assessment:  Annamarie Neal is a 60 year old female scheduled to undergo  EXPLORATORY LAPAROTOMY, BILATERAL SALPINGO-OOPHORECTOMY, TOTAL ABDOMINAL HYSTERECTOMY, POSSIBLE CANCER STAGING WITH LYMPHADENECTOMY AND NEOPLASM DEBULKING with Dr. Camacho on 7/22/20. She has the following specific operative considerations:   - RCRI : 0.9% risk of major adverse cardiac event.   - VTE risk: 3%  - RAJESH # of risks 3/8 = Intermediate risk  - Risk of PONV score = 2.  If > 2, anti-emetic intervention recommended.    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    --Adnexal masses with above procedure now planned.   --HLD. Simvastatin at HS. HTN. Will hold losartan and Maxzide on DOS. No other cardiac history, symptoms or meds. EKG above. Good activity tolerance.   --Current smoker 1/2 PPD X 23 years. Using nicotine gum, now down to 5 cigs daily. Denies pulmonary symptoms.   --Occasional GERD. Uses albuterol inhaler for nighttime coughing related to GERD.  --Pain management. Discussed possibility of nerve block if appropriate for patient's procedure. Final counseling and decisions by regional team on DOS.  --Mildly anxious today.     Type and screen drawn today          Reviewed and Signed by PAC Mid-Level Provider/Resident  Mid-Level Provider/Resident: NOE Patrick, CNS  Date: 7/20/20  Time: 3:14pm    Attending Anesthesiologist Anesthesia Assessment:        Anesthesiologist:   Date:   Time:   Pass/Fail:    Disposition:     PAC Pharmacist Assessment:        Pharmacist:   Date:   Time:    Kimi Garcia, APRN CNS

## 2020-07-21 LAB
ABO + RH BLD: NORMAL
ABO + RH BLD: NORMAL
BLD GP AB SCN SERPL QL: NORMAL
BLOOD BANK CMNT PATIENT-IMP: NORMAL
BLOOD BANK CMNT PATIENT-IMP: NORMAL
SARS-COV-2 RNA SPEC QL NAA+PROBE: NOT DETECTED
SPECIMEN EXP DATE BLD: NORMAL
SPECIMEN SOURCE: NORMAL

## 2020-07-22 ENCOUNTER — ANCILLARY PROCEDURE (OUTPATIENT)
Dept: ULTRASOUND IMAGING | Facility: CLINIC | Age: 60
End: 2020-07-22
Payer: COMMERCIAL

## 2020-07-22 ENCOUNTER — ANESTHESIA (OUTPATIENT)
Dept: SURGERY | Facility: CLINIC | Age: 60
DRG: 742 | End: 2020-07-22
Payer: COMMERCIAL

## 2020-07-22 ENCOUNTER — HOSPITAL ENCOUNTER (INPATIENT)
Facility: CLINIC | Age: 60
LOS: 2 days | Discharge: HOME | DRG: 742 | End: 2020-07-24
Attending: OBSTETRICS & GYNECOLOGY | Admitting: OBSTETRICS & GYNECOLOGY
Payer: COMMERCIAL

## 2020-07-22 DIAGNOSIS — Z90.722 S/P TAH-BSO: Primary | ICD-10-CM

## 2020-07-22 DIAGNOSIS — N94.89 ADNEXAL MASS: ICD-10-CM

## 2020-07-22 DIAGNOSIS — Z90.710 S/P TAH-BSO: Primary | ICD-10-CM

## 2020-07-22 DIAGNOSIS — Z90.79 S/P TAH-BSO: Primary | ICD-10-CM

## 2020-07-22 LAB
CREAT SERPL-MCNC: 0.71 MG/DL (ref 0.52–1.04)
GFR SERPL CREATININE-BSD FRML MDRD: >90 ML/MIN/{1.73_M2}
GLUCOSE BLDC GLUCOMTR-MCNC: 135 MG/DL (ref 70–99)
HGB BLD-MCNC: 12.9 G/DL (ref 11.7–15.7)
POTASSIUM SERPL-SCNC: 3.4 MMOL/L (ref 3.4–5.3)

## 2020-07-22 PROCEDURE — 0W9H0ZX DRAINAGE OF RETROPERITONEUM, OPEN APPROACH, DIAGNOSTIC: ICD-10-PCS | Performed by: OBSTETRICS & GYNECOLOGY

## 2020-07-22 PROCEDURE — 84132 ASSAY OF SERUM POTASSIUM: CPT | Performed by: ANESTHESIOLOGY

## 2020-07-22 PROCEDURE — 25000128 H RX IP 250 OP 636: Performed by: STUDENT IN AN ORGANIZED HEALTH CARE EDUCATION/TRAINING PROGRAM

## 2020-07-22 PROCEDURE — 88309 TISSUE EXAM BY PATHOLOGIST: CPT | Performed by: OBSTETRICS & GYNECOLOGY

## 2020-07-22 PROCEDURE — 0UT20ZZ RESECTION OF BILATERAL OVARIES, OPEN APPROACH: ICD-10-PCS | Performed by: OBSTETRICS & GYNECOLOGY

## 2020-07-22 PROCEDURE — 85018 HEMOGLOBIN: CPT | Performed by: ANESTHESIOLOGY

## 2020-07-22 PROCEDURE — 25000128 H RX IP 250 OP 636: Performed by: OBSTETRICS & GYNECOLOGY

## 2020-07-22 PROCEDURE — 36000062 ZZH SURGERY LEVEL 4 1ST 30 MIN - UMMC: Performed by: OBSTETRICS & GYNECOLOGY

## 2020-07-22 PROCEDURE — 88307 TISSUE EXAM BY PATHOLOGIST: CPT | Performed by: OBSTETRICS & GYNECOLOGY

## 2020-07-22 PROCEDURE — 27210794 ZZH OR GENERAL SUPPLY STERILE: Performed by: OBSTETRICS & GYNECOLOGY

## 2020-07-22 PROCEDURE — 25000565 ZZH ISOFLURANE, EA 15 MIN: Performed by: OBSTETRICS & GYNECOLOGY

## 2020-07-22 PROCEDURE — 0UT70ZZ RESECTION OF BILATERAL FALLOPIAN TUBES, OPEN APPROACH: ICD-10-PCS | Performed by: OBSTETRICS & GYNECOLOGY

## 2020-07-22 PROCEDURE — 25800030 ZZH RX IP 258 OP 636: Performed by: STUDENT IN AN ORGANIZED HEALTH CARE EDUCATION/TRAINING PROGRAM

## 2020-07-22 PROCEDURE — 0UT90ZZ RESECTION OF UTERUS, OPEN APPROACH: ICD-10-PCS | Performed by: OBSTETRICS & GYNECOLOGY

## 2020-07-22 PROCEDURE — 36415 COLL VENOUS BLD VENIPUNCTURE: CPT | Performed by: ANESTHESIOLOGY

## 2020-07-22 PROCEDURE — 36000064 ZZH SURGERY LEVEL 4 EA 15 ADDTL MIN - UMMC: Performed by: OBSTETRICS & GYNECOLOGY

## 2020-07-22 PROCEDURE — 25000132 ZZH RX MED GY IP 250 OP 250 PS 637: Performed by: STUDENT IN AN ORGANIZED HEALTH CARE EDUCATION/TRAINING PROGRAM

## 2020-07-22 PROCEDURE — 37000008 ZZH ANESTHESIA TECHNICAL FEE, 1ST 30 MIN: Performed by: OBSTETRICS & GYNECOLOGY

## 2020-07-22 PROCEDURE — 40000171 ZZH STATISTIC PRE-PROCEDURE ASSESSMENT III: Performed by: OBSTETRICS & GYNECOLOGY

## 2020-07-22 PROCEDURE — 37000009 ZZH ANESTHESIA TECHNICAL FEE, EACH ADDTL 15 MIN: Performed by: OBSTETRICS & GYNECOLOGY

## 2020-07-22 PROCEDURE — 00000146 ZZHCL STATISTIC GLUCOSE BY METER IP

## 2020-07-22 PROCEDURE — 88305 TISSUE EXAM BY PATHOLOGIST: CPT | Performed by: OBSTETRICS & GYNECOLOGY

## 2020-07-22 PROCEDURE — 71000015 ZZH RECOVERY PHASE 1 LEVEL 2 EA ADDTL HR: Performed by: OBSTETRICS & GYNECOLOGY

## 2020-07-22 PROCEDURE — 25800030 ZZH RX IP 258 OP 636: Performed by: ANESTHESIOLOGY

## 2020-07-22 PROCEDURE — 82565 ASSAY OF CREATININE: CPT | Performed by: ANESTHESIOLOGY

## 2020-07-22 PROCEDURE — 71000014 ZZH RECOVERY PHASE 1 LEVEL 2 FIRST HR: Performed by: OBSTETRICS & GYNECOLOGY

## 2020-07-22 PROCEDURE — C9290 INJ, BUPIVACAINE LIPOSOME: HCPCS | Performed by: STUDENT IN AN ORGANIZED HEALTH CARE EDUCATION/TRAINING PROGRAM

## 2020-07-22 PROCEDURE — 88112 CYTOPATH CELL ENHANCE TECH: CPT | Performed by: OBSTETRICS & GYNECOLOGY

## 2020-07-22 PROCEDURE — 12000001 ZZH R&B MED SURG/OB UMMC

## 2020-07-22 PROCEDURE — 00000155 ZZHCL STATISTIC H-CELL BLOCK W/STAIN: Performed by: OBSTETRICS & GYNECOLOGY

## 2020-07-22 PROCEDURE — 88331 PATH CONSLTJ SURG 1 BLK 1SPC: CPT | Performed by: OBSTETRICS & GYNECOLOGY

## 2020-07-22 RX ORDER — DEXAMETHASONE SODIUM PHOSPHATE 4 MG/ML
INJECTION, SOLUTION INTRA-ARTICULAR; INTRALESIONAL; INTRAMUSCULAR; INTRAVENOUS; SOFT TISSUE PRN
Status: DISCONTINUED | OUTPATIENT
Start: 2020-07-22 | End: 2020-07-22

## 2020-07-22 RX ORDER — HEPARIN SODIUM 5000 [USP'U]/.5ML
5000 INJECTION, SOLUTION INTRAVENOUS; SUBCUTANEOUS ONCE
Status: COMPLETED | OUTPATIENT
Start: 2020-07-22 | End: 2020-07-22

## 2020-07-22 RX ORDER — CEFAZOLIN SODIUM 1 G/3ML
1 INJECTION, POWDER, FOR SOLUTION INTRAMUSCULAR; INTRAVENOUS SEE ADMIN INSTRUCTIONS
Status: DISCONTINUED | OUTPATIENT
Start: 2020-07-22 | End: 2020-07-22 | Stop reason: HOSPADM

## 2020-07-22 RX ORDER — AMOXICILLIN 250 MG
1 CAPSULE ORAL 2 TIMES DAILY PRN
Status: DISCONTINUED | OUTPATIENT
Start: 2020-07-22 | End: 2020-07-24 | Stop reason: HOSPADM

## 2020-07-22 RX ORDER — FENTANYL CITRATE 50 UG/ML
25-50 INJECTION, SOLUTION INTRAMUSCULAR; INTRAVENOUS
Status: DISCONTINUED | OUTPATIENT
Start: 2020-07-22 | End: 2020-07-22 | Stop reason: HOSPADM

## 2020-07-22 RX ORDER — KETOROLAC TROMETHAMINE 30 MG/ML
30 INJECTION, SOLUTION INTRAMUSCULAR; INTRAVENOUS EVERY 6 HOURS
Status: DISCONTINUED | OUTPATIENT
Start: 2020-07-22 | End: 2020-07-23

## 2020-07-22 RX ORDER — AMOXICILLIN 250 MG
2 CAPSULE ORAL 2 TIMES DAILY PRN
Status: DISCONTINUED | OUTPATIENT
Start: 2020-07-22 | End: 2020-07-24 | Stop reason: HOSPADM

## 2020-07-22 RX ORDER — HYDROMORPHONE HYDROCHLORIDE 1 MG/ML
.3-.5 INJECTION, SOLUTION INTRAMUSCULAR; INTRAVENOUS; SUBCUTANEOUS EVERY 5 MIN PRN
Status: DISCONTINUED | OUTPATIENT
Start: 2020-07-22 | End: 2020-07-22 | Stop reason: HOSPADM

## 2020-07-22 RX ORDER — NALOXONE HYDROCHLORIDE 0.4 MG/ML
.1-.4 INJECTION, SOLUTION INTRAMUSCULAR; INTRAVENOUS; SUBCUTANEOUS
Status: DISCONTINUED | OUTPATIENT
Start: 2020-07-22 | End: 2020-07-24 | Stop reason: HOSPADM

## 2020-07-22 RX ORDER — LIDOCAINE 40 MG/G
CREAM TOPICAL
Status: DISCONTINUED | OUTPATIENT
Start: 2020-07-22 | End: 2020-07-22 | Stop reason: HOSPADM

## 2020-07-22 RX ORDER — NALOXONE HYDROCHLORIDE 0.4 MG/ML
.1-.4 INJECTION, SOLUTION INTRAMUSCULAR; INTRAVENOUS; SUBCUTANEOUS
Status: ACTIVE | OUTPATIENT
Start: 2020-07-22 | End: 2020-07-23

## 2020-07-22 RX ORDER — CEFAZOLIN SODIUM 2 G/100ML
2 INJECTION, SOLUTION INTRAVENOUS
Status: COMPLETED | OUTPATIENT
Start: 2020-07-22 | End: 2020-07-22

## 2020-07-22 RX ORDER — ACETAMINOPHEN 325 MG/1
975 TABLET ORAL ONCE
Status: COMPLETED | OUTPATIENT
Start: 2020-07-22 | End: 2020-07-22

## 2020-07-22 RX ORDER — DIPHENHYDRAMINE HYDROCHLORIDE 50 MG/ML
25 INJECTION INTRAMUSCULAR; INTRAVENOUS EVERY 6 HOURS PRN
Status: DISCONTINUED | OUTPATIENT
Start: 2020-07-22 | End: 2020-07-24 | Stop reason: HOSPADM

## 2020-07-22 RX ORDER — ONDANSETRON 2 MG/ML
4 INJECTION INTRAMUSCULAR; INTRAVENOUS EVERY 30 MIN PRN
Status: DISCONTINUED | OUTPATIENT
Start: 2020-07-22 | End: 2020-07-22 | Stop reason: HOSPADM

## 2020-07-22 RX ORDER — SODIUM CHLORIDE, SODIUM LACTATE, POTASSIUM CHLORIDE, CALCIUM CHLORIDE 600; 310; 30; 20 MG/100ML; MG/100ML; MG/100ML; MG/100ML
INJECTION, SOLUTION INTRAVENOUS CONTINUOUS
Status: DISCONTINUED | OUTPATIENT
Start: 2020-07-22 | End: 2020-07-23

## 2020-07-22 RX ORDER — FENTANYL CITRATE 50 UG/ML
INJECTION, SOLUTION INTRAMUSCULAR; INTRAVENOUS PRN
Status: DISCONTINUED | OUTPATIENT
Start: 2020-07-22 | End: 2020-07-22

## 2020-07-22 RX ORDER — OXYCODONE HYDROCHLORIDE 5 MG/1
5-10 TABLET ORAL
Status: DISCONTINUED | OUTPATIENT
Start: 2020-07-22 | End: 2020-07-24 | Stop reason: HOSPADM

## 2020-07-22 RX ORDER — SIMETHICONE 80 MG
80 TABLET,CHEWABLE ORAL 4 TIMES DAILY PRN
Status: DISCONTINUED | OUTPATIENT
Start: 2020-07-22 | End: 2020-07-24 | Stop reason: HOSPADM

## 2020-07-22 RX ORDER — PROPOFOL 10 MG/ML
INJECTION, EMULSION INTRAVENOUS PRN
Status: DISCONTINUED | OUTPATIENT
Start: 2020-07-22 | End: 2020-07-22

## 2020-07-22 RX ORDER — BUPIVACAINE HYDROCHLORIDE 2.5 MG/ML
INJECTION, SOLUTION EPIDURAL; INFILTRATION; INTRACAUDAL PRN
Status: DISCONTINUED | OUTPATIENT
Start: 2020-07-22 | End: 2020-07-22

## 2020-07-22 RX ORDER — SODIUM CHLORIDE, SODIUM LACTATE, POTASSIUM CHLORIDE, CALCIUM CHLORIDE 600; 310; 30; 20 MG/100ML; MG/100ML; MG/100ML; MG/100ML
INJECTION, SOLUTION INTRAVENOUS CONTINUOUS
Status: DISCONTINUED | OUTPATIENT
Start: 2020-07-22 | End: 2020-07-22 | Stop reason: HOSPADM

## 2020-07-22 RX ORDER — NALOXONE HYDROCHLORIDE 0.4 MG/ML
.1-.4 INJECTION, SOLUTION INTRAMUSCULAR; INTRAVENOUS; SUBCUTANEOUS
Status: DISCONTINUED | OUTPATIENT
Start: 2020-07-22 | End: 2020-07-22 | Stop reason: HOSPADM

## 2020-07-22 RX ORDER — EPHEDRINE SULFATE 50 MG/ML
INJECTION, SOLUTION INTRAMUSCULAR; INTRAVENOUS; SUBCUTANEOUS PRN
Status: DISCONTINUED | OUTPATIENT
Start: 2020-07-22 | End: 2020-07-22

## 2020-07-22 RX ORDER — FLUMAZENIL 0.1 MG/ML
0.2 INJECTION, SOLUTION INTRAVENOUS
Status: DISCONTINUED | OUTPATIENT
Start: 2020-07-22 | End: 2020-07-22 | Stop reason: HOSPADM

## 2020-07-22 RX ORDER — ONDANSETRON 2 MG/ML
INJECTION INTRAMUSCULAR; INTRAVENOUS PRN
Status: DISCONTINUED | OUTPATIENT
Start: 2020-07-22 | End: 2020-07-22

## 2020-07-22 RX ORDER — ONDANSETRON 4 MG/1
4 TABLET, ORALLY DISINTEGRATING ORAL EVERY 8 HOURS PRN
Status: DISCONTINUED | OUTPATIENT
Start: 2020-07-23 | End: 2020-07-24 | Stop reason: HOSPADM

## 2020-07-22 RX ORDER — ACETAMINOPHEN 325 MG/1
650 TABLET ORAL EVERY 6 HOURS
Status: DISCONTINUED | OUTPATIENT
Start: 2020-07-22 | End: 2020-07-24

## 2020-07-22 RX ORDER — ONDANSETRON 4 MG/1
4 TABLET, ORALLY DISINTEGRATING ORAL EVERY 8 HOURS
Status: DISPENSED | OUTPATIENT
Start: 2020-07-22 | End: 2020-07-23

## 2020-07-22 RX ORDER — CALCIUM CARBONATE 500 MG/1
1000 TABLET, CHEWABLE ORAL 4 TIMES DAILY PRN
Status: DISCONTINUED | OUTPATIENT
Start: 2020-07-22 | End: 2020-07-24 | Stop reason: HOSPADM

## 2020-07-22 RX ORDER — LABETALOL 20 MG/4 ML (5 MG/ML) INTRAVENOUS SYRINGE
5-10 EVERY 10 MIN PRN
Status: DISCONTINUED | OUTPATIENT
Start: 2020-07-22 | End: 2020-07-22 | Stop reason: HOSPADM

## 2020-07-22 RX ORDER — DIPHENHYDRAMINE HCL 25 MG
25 CAPSULE ORAL EVERY 6 HOURS PRN
Status: DISCONTINUED | OUTPATIENT
Start: 2020-07-22 | End: 2020-07-24 | Stop reason: HOSPADM

## 2020-07-22 RX ORDER — LIDOCAINE 40 MG/G
CREAM TOPICAL
Status: DISCONTINUED | OUTPATIENT
Start: 2020-07-22 | End: 2020-07-24 | Stop reason: HOSPADM

## 2020-07-22 RX ORDER — HYDROMORPHONE HCL/0.9% NACL/PF 0.2MG/0.2
0.2 SYRINGE (ML) INTRAVENOUS
Status: DISCONTINUED | OUTPATIENT
Start: 2020-07-22 | End: 2020-07-23

## 2020-07-22 RX ORDER — ONDANSETRON 4 MG/1
4 TABLET, ORALLY DISINTEGRATING ORAL EVERY 30 MIN PRN
Status: DISCONTINUED | OUTPATIENT
Start: 2020-07-22 | End: 2020-07-22 | Stop reason: HOSPADM

## 2020-07-22 RX ORDER — BISACODYL 10 MG
10 SUPPOSITORY, RECTAL RECTAL DAILY
Status: DISCONTINUED | OUTPATIENT
Start: 2020-07-22 | End: 2020-07-24 | Stop reason: HOSPADM

## 2020-07-22 RX ORDER — SIMVASTATIN 10 MG
40 TABLET ORAL AT BEDTIME
Status: DISCONTINUED | OUTPATIENT
Start: 2020-07-22 | End: 2020-07-24 | Stop reason: HOSPADM

## 2020-07-22 RX ORDER — LIDOCAINE HYDROCHLORIDE 20 MG/ML
INJECTION, SOLUTION INFILTRATION; PERINEURAL PRN
Status: DISCONTINUED | OUTPATIENT
Start: 2020-07-22 | End: 2020-07-22

## 2020-07-22 RX ADMIN — FENTANYL CITRATE 25 MCG: 50 INJECTION, SOLUTION INTRAMUSCULAR; INTRAVENOUS at 12:01

## 2020-07-22 RX ADMIN — EPHEDRINE SULFATE 2.5 MG: 50 INJECTION, SOLUTION INTRAMUSCULAR; INTRAVENOUS; SUBCUTANEOUS at 09:00

## 2020-07-22 RX ADMIN — CEFAZOLIN 1 G: 1 INJECTION, POWDER, FOR SOLUTION INTRAMUSCULAR; INTRAVENOUS at 11:15

## 2020-07-22 RX ADMIN — EPHEDRINE SULFATE 7.5 MG: 50 INJECTION, SOLUTION INTRAMUSCULAR; INTRAVENOUS; SUBCUTANEOUS at 09:07

## 2020-07-22 RX ADMIN — Medication 0.5 MG: at 11:18

## 2020-07-22 RX ADMIN — Medication 2 G: at 09:15

## 2020-07-22 RX ADMIN — FENTANYL CITRATE 50 MCG: 50 INJECTION, SOLUTION INTRAMUSCULAR; INTRAVENOUS at 08:27

## 2020-07-22 RX ADMIN — EPHEDRINE SULFATE 10 MG: 50 INJECTION, SOLUTION INTRAMUSCULAR; INTRAVENOUS; SUBCUTANEOUS at 09:30

## 2020-07-22 RX ADMIN — ACETAMINOPHEN 975 MG: 325 TABLET, FILM COATED ORAL at 07:59

## 2020-07-22 RX ADMIN — HYDROMORPHONE HYDROCHLORIDE 0.5 MG: 1 INJECTION, SOLUTION INTRAMUSCULAR; INTRAVENOUS; SUBCUTANEOUS at 16:15

## 2020-07-22 RX ADMIN — SODIUM CHLORIDE, POTASSIUM CHLORIDE, SODIUM LACTATE AND CALCIUM CHLORIDE: 600; 310; 30; 20 INJECTION, SOLUTION INTRAVENOUS at 09:53

## 2020-07-22 RX ADMIN — FENTANYL CITRATE 50 MCG: 50 INJECTION, SOLUTION INTRAMUSCULAR; INTRAVENOUS at 08:36

## 2020-07-22 RX ADMIN — HYDROMORPHONE HYDROCHLORIDE 0.3 MG: 1 INJECTION, SOLUTION INTRAMUSCULAR; INTRAVENOUS; SUBCUTANEOUS at 12:09

## 2020-07-22 RX ADMIN — FENTANYL CITRATE 100 MCG: 50 INJECTION, SOLUTION INTRAMUSCULAR; INTRAVENOUS at 09:27

## 2020-07-22 RX ADMIN — Medication 100 MG: at 09:00

## 2020-07-22 RX ADMIN — DEXAMETHASONE SODIUM PHOSPHATE 4 MG: 4 INJECTION, SOLUTION INTRA-ARTICULAR; INTRALESIONAL; INTRAMUSCULAR; INTRAVENOUS; SOFT TISSUE at 08:59

## 2020-07-22 RX ADMIN — MIDAZOLAM 1 MG: 1 INJECTION INTRAMUSCULAR; INTRAVENOUS at 08:27

## 2020-07-22 RX ADMIN — MAGNESIUM HYDROXIDE 15 ML: 400 SUSPENSION ORAL at 18:03

## 2020-07-22 RX ADMIN — FENTANYL CITRATE 100 MCG: 50 INJECTION, SOLUTION INTRAMUSCULAR; INTRAVENOUS at 08:55

## 2020-07-22 RX ADMIN — HEPARIN SODIUM 5000 UNITS: 5000 INJECTION, SOLUTION INTRAVENOUS; SUBCUTANEOUS at 09:23

## 2020-07-22 RX ADMIN — EPHEDRINE SULFATE 5 MG: 50 INJECTION, SOLUTION INTRAMUSCULAR; INTRAVENOUS; SUBCUTANEOUS at 09:19

## 2020-07-22 RX ADMIN — SODIUM CHLORIDE, POTASSIUM CHLORIDE, SODIUM LACTATE AND CALCIUM CHLORIDE: 600; 310; 30; 20 INJECTION, SOLUTION INTRAVENOUS at 21:52

## 2020-07-22 RX ADMIN — ACETAMINOPHEN 650 MG: 325 TABLET, FILM COATED ORAL at 23:32

## 2020-07-22 RX ADMIN — FENTANYL CITRATE 50 MCG: 50 INJECTION, SOLUTION INTRAMUSCULAR; INTRAVENOUS at 10:27

## 2020-07-22 RX ADMIN — SIMVASTATIN 40 MG: 10 TABLET, FILM COATED ORAL at 21:52

## 2020-07-22 RX ADMIN — SODIUM CHLORIDE, POTASSIUM CHLORIDE, SODIUM LACTATE AND CALCIUM CHLORIDE: 600; 310; 30; 20 INJECTION, SOLUTION INTRAVENOUS at 12:18

## 2020-07-22 RX ADMIN — ONDANSETRON 4 MG: 4 TABLET, ORALLY DISINTEGRATING ORAL at 18:03

## 2020-07-22 RX ADMIN — FENTANYL CITRATE 25 MCG: 50 INJECTION, SOLUTION INTRAMUSCULAR; INTRAVENOUS at 12:04

## 2020-07-22 RX ADMIN — Medication 0.5 MG: at 11:08

## 2020-07-22 RX ADMIN — SODIUM CHLORIDE, POTASSIUM CHLORIDE, SODIUM LACTATE AND CALCIUM CHLORIDE: 600; 310; 30; 20 INJECTION, SOLUTION INTRAVENOUS at 08:47

## 2020-07-22 RX ADMIN — BUPIVACAINE HYDROCHLORIDE 40 ML: 2.5 INJECTION, SOLUTION EPIDURAL; INFILTRATION; INTRACAUDAL at 08:40

## 2020-07-22 RX ADMIN — Medication 100 MCG: at 09:58

## 2020-07-22 RX ADMIN — PROPOFOL 130 MG: 10 INJECTION, EMULSION INTRAVENOUS at 08:59

## 2020-07-22 RX ADMIN — ONDANSETRON 4 MG: 2 INJECTION INTRAMUSCULAR; INTRAVENOUS at 09:05

## 2020-07-22 RX ADMIN — KETOROLAC TROMETHAMINE 30 MG: 30 INJECTION, SOLUTION INTRAMUSCULAR at 18:03

## 2020-07-22 RX ADMIN — Medication 0.5 MG: at 10:32

## 2020-07-22 RX ADMIN — Medication 100 MCG: at 09:36

## 2020-07-22 RX ADMIN — Medication 30 MG: at 09:51

## 2020-07-22 RX ADMIN — ACETAMINOPHEN 650 MG: 325 TABLET, FILM COATED ORAL at 18:02

## 2020-07-22 RX ADMIN — LIDOCAINE HYDROCHLORIDE 100 MG: 20 INJECTION, SOLUTION INFILTRATION; PERINEURAL at 08:59

## 2020-07-22 RX ADMIN — BUPIVACAINE 20 ML: 13.3 INJECTION, SUSPENSION, LIPOSOMAL INFILTRATION at 08:40

## 2020-07-22 ASSESSMENT — ACTIVITIES OF DAILY LIVING (ADL)
AMBULATION: 0-->INDEPENDENT
DRESS: 0-->INDEPENDENT
ADLS_ACUITY_SCORE: 10
TRANSFERRING: 0-->INDEPENDENT
RETIRED_EATING: 0-->INDEPENDENT
TOILETING: 0-->INDEPENDENT
SWALLOWING: 0-->SWALLOWS FOODS/LIQUIDS WITHOUT DIFFICULTY
RETIRED_COMMUNICATION: 0-->UNDERSTANDS/COMMUNICATES WITHOUT DIFFICULTY
FALL_HISTORY_WITHIN_LAST_SIX_MONTHS: NO
COGNITION: 0 - NO COGNITION ISSUES REPORTED
BATHING: 0-->INDEPENDENT

## 2020-07-22 ASSESSMENT — MIFFLIN-ST. JEOR: SCORE: 1444

## 2020-07-22 ASSESSMENT — PAIN DESCRIPTION - DESCRIPTORS
DESCRIPTORS: ACHING
DESCRIPTORS: ACHING;SORE

## 2020-07-22 NOTE — PROGRESS NOTES
Gynecologic Oncology Postoperative Check Note  7/22/2020    S: Patient reports she is doing well postoperatively. Pain is well controlled with pain medications. Hasn't ambulated yet. Goodwin in place. Tolerated ice chips, no other food yet. Denies nausea/vomiting, chest pain, shortness of breath, dizziness, or other concerns at this time.     O:  Patient Vitals for the past 4 hrs:   BP Temp Temp src Pulse Heart Rate Resp SpO2   07/22/20 1215 134/75 97.9  F (36.6  C) Oral -- 74 13 98 %   07/22/20 1200 136/76 -- -- 73 76 12 97 %   07/22/20 1145 132/75 97.7  F (36.5  C) Oral 79 83 12 99 %   07/22/20 0845 (!) 145/92 -- -- 84 -- 16 95 %   07/22/20 0841 -- -- -- -- -- -- 97 %   07/22/20 0840 (!) 142/89 -- -- 76 -- 16 96 %       Gen: NAD  Cardio: RRR, S1/S2, no murmurs  Resp: CTAB, no wheezing or crackles  Abdomen: soft, appropriately tender, bruising around the incision, otherwise C/D/I  Extremities: Non-tender, trace edema    A/P: 60 year old POD#0 s/p XL, STACEY, BSO, pelvic washing. Doing as expected post-operatively.    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 S/p TAP. Transition to ibuprofen tomorrow.  Heme: Hgb 12.9> . AM CBC ordered.  CV: HTN: losartan [HELD], triamterene-HCTZ [HELD]. Resume as needed. HLD: simvastatin  Pulm: Tobacco use: nicotine gum PRN, wean O2  GI: Bowel regimen, antiemetics PRN  : goodwin   ID: S/p pre-op antibiotics  Endo/Psych/Neuro/MSK: No issues  PPX: SCDs, IS, lovenox on POD#1  Dispo: Home when meeting goals  Drains/Lines: PIV x2, goodwin    Dispo: Admit to 7C when bed available    Rosaline Ortega MD  Gynecology Oncology PGY4  7/22/2020 12:38 PM

## 2020-07-22 NOTE — ANESTHESIA POSTPROCEDURE EVALUATION
Anesthesia POST Procedure Evaluation    Patient: Annamarie Neal   MRN:     [id number] Gender:   female   Age:    60 year old :      1960        Preoperative Diagnosis: Adnexal mass [N94.89]   Procedure(s):  EXPLORATORY LAPAROTOMY, BILATERAL SALPINGO-OOPHORECTOMY, TOTAL ABDOMINAL HYSTERECTOMY, PELVIC WASHINGS   Postop Comments: No value filed.     Anesthesia Type: General       Disposition: Admission   Postop Pain Control: Uneventful            Sign Out: Well controlled pain   PONV: No   Neuro/Psych: Uneventful            Sign Out: Acceptable/Baseline neuro status   Airway/Respiratory: Uneventful            Sign Out: Acceptable/Baseline resp. status   CV/Hemodynamics: Uneventful            Sign Out: Acceptable CV status   Other NRE: NONE   DID A NON-ROUTINE EVENT OCCUR? No    Event details/Postop Comments:  Awake, comfortable; satisfactory recovery from GA.    Mukul Reeves MD  [phone]               Last Anesthesia Record Vitals:  CRNA VITALS  2020 1113 - 2020 1213      2020             NIBP:  132/75    Pulse:  84    SpO2:  98 %    Resp Rate (observed):  16          Last PACU Vitals:  Vitals Value Taken Time   /78 2020  1:00 PM   Temp 36.6  C (97.9  F) 2020  1:00 PM   Pulse 81 2020  1:00 PM   Resp 13 2020  1:00 PM   SpO2 99 % 2020  1:08 PM   Temp src     NIBP 132/75 2020 11:46 AM   Pulse 84 2020 11:46 AM   SpO2 98 % 2020 11:46 AM   Resp     Temp     Ht Rate     Temp 2     Vitals shown include unvalidated device data.      Electronically Signed By: Mukul Reeves MD, 2020, 1:09 PM

## 2020-07-22 NOTE — DISCHARGE SUMMARY
Gynecologic Oncology Discharge Summary    Annamarie Neal  [id number]    Admit Date: 7/22/2020  Discharge Date: 7/24/2020  Admitting Provider: Anibal Graham MD  Discharge Provider: Anibal Graham MD    Admission Dx:   - Adnexal mass  - Hypertension  - Tobacco use  - Hyperlipidemia    Discharge Dx:  - Same, except below  -benign serous cystadenoma on frozen   -s/p procedure below     Procedures: Exploratory laparotomy, total abdominal hysterectomy, bilateral salpingo-oophorectomy, pelvic washings    Prior to Admission Medications:  Medications Prior to Admission   Medication Sig Dispense Refill Last Dose     albuterol (PROAIR HFA/PROVENTIL HFA/VENTOLIN HFA) 108 (90 Base) MCG/ACT inhaler Inhale 1-2 puffs into the lungs every 4 hours as needed   Past Month at Unknown time     losartan (COZAAR) 25 MG tablet Take 25 mg by mouth every morning    7/21/2020 at 0800     simvastatin (ZOCOR) 40 MG tablet Take 40 mg by mouth At Bedtime    7/21/2020 at 2000     triamterene-HCTZ (MAXZIDE-25) 37.5-25 MG tablet Take 1 tablet by mouth every morning    7/21/2020 at 0800     Ascorbic Acid (VITAMIN C PO) Take 1 tablet by mouth daily Last dose 7.17.2020 7/18/2020     CALCIUM-VITAMIN D PO Take 1 tablet by mouth daily Last dose 7.17.2020 7/18/2020     naloxone (NARCAN) 4 MG/0.1ML nasal spray After removing nasal spray from box, gently insert the tip of the nozzle into either nostril. Press the plunger firmly to give dose; remove device from nose. If no reaction in 2-3 minutes, give a second dose.        nicotine polacrilex (NICORETTE) 4 MG gum Take 4 mg by mouth as needed (Pt last dose 7.15.2020)    7/15/2020     vitamin (B COMPLEX-C) tablet Take 1 tablet by mouth daily Last dose 7.17.2020 7/18/2020     Discharge Medications:  Current Discharge Medication List      START taking these medications    Details   acetaminophen (TYLENOL) 325 MG tablet Take 2 tablets (650 mg) by mouth every 6 hours for 3 days  Qty: 24 tablet,  Refills: 0    Associated Diagnoses: S/P STACEY-BSO      ibuprofen (ADVIL/MOTRIN) 600 MG tablet Take 1 tablet (600 mg) by mouth every 6 hours for 3 days  Qty: 12 tablet, Refills: 0    Associated Diagnoses: S/P STACEY-BSO      methocarbamol (ROBAXIN) 500 MG tablet Take 1 tablet (500 mg) by mouth 4 times daily  Qty: 20 tablet, Refills: 0    Associated Diagnoses: S/P STACEY-BSO      oxyCODONE (ROXICODONE) 5 MG tablet Take 1 tablet (5 mg) by mouth every 6 hours as needed for breakthrough pain  Qty: 12 tablet, Refills: 0    Associated Diagnoses: S/P STACEY-BSO      senna-docusate (SENOKOT-S/PERICOLACE) 8.6-50 MG tablet Take 2 tablets by mouth 2 times daily as needed for constipation  Qty: 60 tablet, Refills: 1    Associated Diagnoses: S/P STACEY-BSO         CONTINUE these medications which have CHANGED    Details   !! nicotine polacrilex (NICORETTE) 4 MG gum Take 1 each (4 mg) by mouth every hour as needed for smoking cessation (Pt last dose 7.15.2020)  Qty: 50 each, Refills: 3    Associated Diagnoses: S/P STACEY-BSO       !! - Potential duplicate medications found. Please discuss with provider.      CONTINUE these medications which have NOT CHANGED    Details   albuterol (PROAIR HFA/PROVENTIL HFA/VENTOLIN HFA) 108 (90 Base) MCG/ACT inhaler Inhale 1-2 puffs into the lungs every 4 hours as needed    Comments: Pharmacy may dispense brand covered by insurance (Proair, or proventil or ventolin or generic albuterol inhaler)      losartan (COZAAR) 25 MG tablet Take 25 mg by mouth every morning       simvastatin (ZOCOR) 40 MG tablet Take 40 mg by mouth At Bedtime       triamterene-HCTZ (MAXZIDE-25) 37.5-25 MG tablet Take 1 tablet by mouth every morning       Ascorbic Acid (VITAMIN C PO) Take 1 tablet by mouth daily Last dose 7.17.2020      CALCIUM-VITAMIN D PO Take 1 tablet by mouth daily Last dose 7.17.2020      naloxone (NARCAN) 4 MG/0.1ML nasal spray After removing nasal spray from box, gently insert the tip of the nozzle into either nostril.  Press the plunger firmly to give dose; remove device from nose. If no reaction in 2-3 minutes, give a second dose.      !! nicotine polacrilex (NICORETTE) 4 MG gum Take 4 mg by mouth as needed (Pt last dose 7.15.2020)       vitamin (B COMPLEX-C) tablet Take 1 tablet by mouth daily Last dose 7.17.2020       !! - Potential duplicate medications found. Please discuss with provider.        Consultations:  Anesthesia    Brief History of Illness:  Annamarie Neal is a 60 year old patient who presented with bilateral pelvic masses with an elevated . Surgical intervention was recommended.    Hospital Course:  Dz:   - Preoperative diagnosis was pelvic mass.  Frozen section at the time of the surgery showed benign serous cystadenoma.  Final pathology is pending at the time of discharge.  She will follow-up postoperatively for a care plan.  FEN:   - She was maintained on IVF until POD#1, when her diet was slowly advanced.  By discharge, she was tolerating a regular diet without nausea and vomiting and able to maintain her hydration without IVF supplementation.  Pain:   - Her pain was initially controlled on PO and IV narcotics.  Once tolerating PO pain meds, she was transitioned to a PO pain regimen.  Her pain was well controlled on this and she was discharged home with these medications.  CV:   - She has a history of hypertension. Her antihypertensives (losartan, triamterene-hydrochlorothiazide) were held until POD#1. She has hyperlipidemia, she was continued on her simvastatin. Her vital signs were stable while in house and she had no acute CV issues.  PULM:   - She is a current tobacco user. She was initially given O2 supplementation in to maintain her O2 sats in the immediate postop period and was transitioned off of this without difficulty POD#1.  Respiratory therapy provided smoking cessation counseling.  By discharge, her O2 sats were greater than 94% on RA.  She was encouraged to use her bedside IS while in house.  Smoking cessation was discussed. She had no acute pulmonary issues while in house.  HEME:   - Her preoperative Hgb was 12.7.  Her hgb dropped to 12.0 postoperatively.  She had no other acute heme issues while in house.  GI:   - She was made NPO prior to the procedure.  On POD#0, her diet was advanced to clear liquids and then advanced slowly as tolerated.  At the time of discharge, she was tolerating a regular diet without nausea and vomiting.  She will be discharged with a bowel regimen to prevent constipation in the postoperative period.  She had no acute GI issues while in house.  :    - A goodwin catheter was placed at the time of the surgery.  Once ambulating unassisted, POD#1 the goodwin catheter was removed.  Prior to discharge, the patient was voiding spontaneously without difficulty.  She had no acute  issues while in house.  ID:   - The patient was AF during her hospitalization.  She received standard preoperative antibiotics without incident.    ENDO:   - No issues  PSYCH/NEURO:   - No issues  PPX:    -  She was given SCDs, IS, PPI and lovenox during her hospital course.  She tolerated these prophylactic interventions without incident.  They were discontinued at the time of her discharge.    Discharge Instructions and Follow up:  Ms. Annamarie Neal was discharged from the hospital with follow up for routine postoperative follow-up and for final pathology with Dr. Camacho on 8/13 0745.    Discharge Diet: Regular  Discharge Activity:   Lifting restricted to 15 pounds  No lifting, driving, or strenuous exercise for 6 week(s)  No heavy lifting, pushing, pulling for 6 week(s)  No sex for 6 week(s)  No driving or operating machinery while on narcotic analgesics  Pelvic rest: abstain from intercourse and do not use tampons for 6 week(s)  Discharge Follow up: Dr. Camacho on 8/13 0745.    Discharge Disposition:  Discharged to home    Discharge Staff: Anibal Graham MD Jill Miller MD  OBGYN Resident  PGY4  07/24/2020 7:55 AM    Provider Disclosure:   I agree with above History, Review of Systems, Physical exam and Plan. I have reviewed the content of the documentation and have edited it as needed. I have seen and personally performed the services documented here and the documentation accurately represents those services and the decisions I have made.     Electronically signed by:   Anibal Graham MD   Gynecologic Oncology   [facility]

## 2020-07-22 NOTE — ANESTHESIA CARE TRANSFER NOTE
Patient: Annamarie Neal    Procedure(s):  EXPLORATORY LAPAROTOMY, BILATERAL SALPINGO-OOPHORECTOMY, TOTAL ABDOMINAL HYSTERECTOMY, PELVIC WASHINGS    Diagnosis: Adnexal mass [N94.89]  Diagnosis Additional Information: No value filed.    Anesthesia Type:   General     Note:  Airway :Face Mask  Patient transferred to:PACU  Comments: Patient oxygenating and ventilating well on 4LFM.  Patient BANERJEE, following commands, and denies pain.  Report given to RN and VSS.Handoff Report: Identifed the Patient, Identified the Reponsible Provider, Reviewed the pertinent medical history, Discussed the surgical course, Reviewed Intra-OP anesthesia mangement and issues during anesthesia, Set expectations for post-procedure period and Allowed opportunity for questions and acknowledgement of understanding      Vitals: (Last set prior to Anesthesia Care Transfer)    CRNA VITALS  7/22/2020 1113 - 7/22/2020 1147      7/22/2020             NIBP:  132/75    Pulse:  84    SpO2:  98 %    Resp Rate (observed):  16                Electronically Signed By: NOE Martin CRNA  July 22, 2020  11:47 AM

## 2020-07-22 NOTE — OP NOTE
Gynecologic Oncology Operative Report    Annamarie Neal  [id number]  DOS: 7/22/2020    Pre-operative diagnosis:             - Pelvic mass  - Tobacco use  - Hypertension  - Hyperlipidemia     Post-operative diagnosis:  - Same s/p procedure  - Ovarian torsion  - Benign serous cystadenoma on frozen section     Procedure: Exploratory laparotomy, total abdominal hysterectomy, bilateral salpingo-oophorectomy, pelvic washings     Surgeon:   Sushma Camacho MD     Assistants:  Yoli Mandel MD, Gyn Onc Fellow  Rosaline Ortega MD, PGY4  Lesley Sexton MS4     Anesthesia: General                 Estimated blood loss:  150 mL  IV Fluids: 1500 mL crystalloid  UOP: 300 mL     Drains: Batista     Specimens:  ID Type Source Tests Collected by Time Destination   1 : Pelvic Washings Washings Pelvis CYTOLOGY NON GYN Sushma Camacho MD 7/22/2020  9:40 AM     A : Right Fallopian Tube and Ovary Tissue Fallopian Tube and Ovary, Right SURGICAL PATHOLOGY EXAM Sushma Camacho MD 7/22/2020  9:59 AM     B : Left Fallopian Tube and Ovary Tissue Fallopian Tube and Ovary, Left SURGICAL PATHOLOGY EXAM Sushma Camacho MD 7/22/2020 10:09 AM     C : Uterus and Cervix Tissue Uterus and Cervix SURGICAL PATHOLOGY EXAM Sushma Camacho MD 7/22/2020 10:37 AM         Complications:  None apparent    Indications:  Annamarie Neal is a 60 year old with bilateral pelvic masses.  Following a thorough discussion of the risks, benefits, indications and alternatives she consented to the above procedure.    Operative Findings:  Exam under anesthesia demonstrates two distinct pelvic masses, mass on right adnexa mobile, posterior mass fixed. Laparotomy revealed two ovarian masses, with right larger than left. The right ovary was torsed. Both ovaries with multicystic masses. Uterus with multiple small fibroids approximately 1cm in size. Otherwise normal appearing fallopian tubes, bowel, appendix. Liver and spleen palpated normal. Minor adhesions of the omentum to  the anterior abdominal wall, otherwise no intraabdominal adhesions. Frozen section favoring benign serous cystadenoma.    Operative Procedure:  The consent was reviewed with the patient in the preoperative setting. She received prophylactic antibiotics. In addition, she received heparin for venous thrombosis prevention.  She was subsequently transferred to the operating room for the above-mentioned procedure. The patient was placed in dorsal supine position. General anesthetic was obtained without noted difficulties. Batista catheter was placed under sterile techniques. The patient was then prepped and draped for an abdominal procedure. Timeout was called at which point the patient's name, operative procedure and site was confirmed by the operative team. A midline vertical skin incision was then made from the level of pubic symphysis and ultimately extending just above the umbilicus. Incision was performed sharply and carried through the subcutaneous layer with cautery. Fascia was incised and extended superiorly and inferiorly with electrocautery. Peritoneum was then elevated and entered with the Metzenbaum scissors. Peritoneal incision was extended without any injury to nearby structures. At this point, exploration of the pelvis and upper abdomen was performed. The Bookwalter retractor was positioned and utilized throughout the course of the procedure. Pelvic washings were next obtained and sent off to cytology.  Bowels were packed up into the upper abdomen and appropriate retractors positioned.  Initially on the right side, the round ligament was isolated, transected with cautery and then suture ligated.  We extended the peritoneal incision of the right aspect of the psoas muscle and the right retroperitoneal space was entered.  The right ureter was identified deep in the pelvis and a window was made in the medial leaf of the broad ligament ensuring to stay well above the level of the ureter.  The right ovarian  vessels were then isolated, doubly clamped, cut and suture ligated.  The right utero-ovarian ligament was then isolated, doubly clamped, cut and suture ligated and the right tube and ovary were removed and sent for frozen pathology which returned as benign. Attention was then turned to the left and the round ligament was isolated, transected with cautery and suture ligated. We extended the peritoneal incision over the left aspect of the psoas muscle. Left retroperitoneal exploration was performed. We identified the ureter deep in the pelvis. Well above that site, a window was made in the medial leaf of the broad ligament and the left ovarian vessels were then isolated, clamped x2, cut and suture ligated. The uteroovarian vessels were similarly clamped and suture ligated. The left ovary and fallopian tube were sent for permanent section. Next the peritoneal incision over the lower uterine segment was incised. The vesicouterine peritoneum was then fully developed. The bladder was retracted to the level of the upper vagina. Bilateral uterine arteries were now isolated and clamped in a serial manner. We then cut and suture ligated with Vicryl.  The bilateral cardinal ligaments were next clamped and cut and suture ligated with Vicryl. We proceeded until we could isolate out the uterosacral ligaments, which were similarly cut and suture ligated.  Curved parametrial clamps were used to clamp below the cervix and the romario scissors were used to perform the colpotomy and the full cervix was resected along with the uterus. The apices were sutured with a Bhumika stitch. The remaining vaginal cuff was grasped with an Allis and closed in a series of figure of X sutures. At this point, the pelvis was hemostatic.  Attention was then turned to the bowel.  The entire bowel was run, which was normal without evidence of disease. At this point, we performed irrigation of the pelvis and upper abdomen with warm water. All laparotomy  sponges were next removed. Fascia was closed with 0 looped PDS in 2 segments meeting in the middle. Subcutaneous tissue was reapproximated and skin closed in a running subcuticular fashion. The incision was covered with Exofin.     Sponge, lap, instrument and needle counts were reported as correct x 2. Dr. Camacho was scrubbed and present for the entire procedure. The patient was then repositioned appropriately, recalled from the general anesthetic and was transferred to recovery unit in stable condition.       Rosaline Ortega MD  OBGYN Resident PGY4  07/22/2020 12:37 PM     As the primary surgeon, I was scrubbed and present for the full course of the procedure.    Sushma Camacho M.D.

## 2020-07-22 NOTE — OR NURSING
Dr. REMI Reeves with anesthesia notified that pt is ready for a sign out in PACU.  Dr. Reeves verbalized he would place sign out in chart and that pt is ok to proceed to 7C.

## 2020-07-22 NOTE — PROGRESS NOTES
Gynecologic Oncology Daily Progress Note  2020    Annamarie Neal  : 1960  MRN: [id number]    POD#1 s/p XL, STACEY, BSO  Disease: Benign, likely serous cystadenoma    24 hour events:   - Surgery  - Low urine output, resolved after 250cc LR bolus  - Hypertensive overnight    Subjective: Doing well this morning. Pain is overall well managed on medications. No flatus or bowel movement. Tolerating water without nausea or vomiting. No other PO intake so far. No lightheadedness, chest pain, shortness of breath. Patient smokes cigarettes, states that she is okay using nicorette gum, does not want the patch.     Objective:  Patient Vitals for the past 16 hrs:   BP Temp Temp src Pulse Heart Rate Resp SpO2   20 0319 (!) 158/89 99  F (37.2  C) Oral 82 82 14 95 %   20 2150 (!) 154/92 98.8  F (37.1  C) Oral -- 83 18 92 %   20 1731 (!) 155/93 98.3  F (36.8  C) Oral 83 -- 13 97 %   20 1700 (!) 151/91 -- -- 81 -- 18 95 %   20 1630 (!) 147/91 -- -- 87 -- -- 95 %       GEN - NAD, sitting comfortably in bed  CV - RRR  PULM - CTAB, no wheezing. Breathing comfortably on RA.   ABD - soft, non-distended, appropriately tender  INC - c/d/i  EXT - no edema, non-tender, SCDs in place  L/D - PIV x2, goodwin    I/O (last 24h // since midnight)  I: PO: 265mL//none recorded; IV: 3568mL//1050mL   O: UOP: 1875mL//1100mL    LABS:  BMP  Recent Labs   Lab 20     --    POTASSIUM 3.7 3.4   CHLORIDE 105  --    RADHIKA 8.7  --    CO2 28  --    BUN 10  --    CR 0.65 0.71   *  --      CBC  Recent Labs   Lab 20   WBC 10.9  --    RBC 3.94  --    HGB 12.0 12.9   HCT 36.0  --    MCV 91  --    MCH 30.5  --    MCHC 33.3  --    RDW 13.0  --      --        Assessment: Annamarie Neal is a 60 year old now POD#1 s/p XL, STACEY, BSO, pelvic washing. Doing well post-operatively.    Plan:  Dz: Benign, likely serous cystadenoma on frozen pathology. Will follow up with   Janice on 8/13 0745 for routine postoperative follow up and treatment planning pending on final pathology.  FEN: Reg diet, LR @100m/h. Will stop IVF this AM.   Pain: Pain is well controlled on scheduled tylenol and toradol. S/p TAP. Transition to ibuprofen today.  Heme: No symptoms of acute blood loss anemia or intraabdominal bleeding. Morning hemoglobin 12.0.   CV: HTN: losartan, triamterene-hydrochlorothiazide resumed due to hypertension overnight. HLD: simvastatin  Pulm: Tobacco use: nicotine gum PRN, off O2. Discussed smoking cessation this morning.  GI: Bowel regimen, antiemetics PRN  : Goodwin. Remove today.   ID: S/p pre-op antibiotics  Endo/Psych/Neuro/MSK: No issues  PPX: SCDs, IS, lovenox today as hemoglobin is stable.   Dispo: Home when meeting goals  Drains/Lines: PIV x2, goodwin    Rafia Bashir MS4    I was present with the medical student who participated in the service and in the documentation of this note.  I have verified the history and personally performed the physical exam and medical decision making, and have verified the content of the note, which accurately reflect my assessment of the patient and the plan of care.     Rosaline Ortega MD  Gynecology Oncology PGY4  Pager: [phone]  07/23/2020 8:03 AM    Provider Disclosure:   I agree with above History, Review of Systems, Physical exam and Plan. I have reviewed the content of the documentation and have edited it as needed. I have seen and personally performed the services documented here and the documentation accurately represents those services and the decisions I have made.     Electronically signed by:   Anibal Graham MD   Gynecologic Oncology   [facility]

## 2020-07-22 NOTE — ANESTHESIA PROCEDURE NOTES
Peripheral Nerve Block Procedure Note  Staff -   Anesthesiologist:  Socorro Serna MD      Performed By: anesthesiologist        Location: Pre-op  Procedure Start/Stop TImes:      7/22/2020 8:32 AM     7/22/2020 8:40 AM    patient identified, IV checked, site marked, risks and benefits discussed, informed consent, monitors and equipment checked, pre-op evaluation, at physician/surgeon's request and post-op pain management      Correct Patient: Yes      Correct Position: Yes      Correct Site: Yes      Correct Procedure: Yes      Correct Laterality:  Yes    Site Marked:  Yes  Procedure details:     Procedure:  TAP    Diagnosis:  Postoperative pain relief    Laterality:  Bilateral    Position:  Supine    Sterile Prep: chloraprep, mask and sterile gloves      Local skin infiltration:  None    Needle:  Insulated    Needle gauge:  21    Needle length (mm):  110    Ultrasound: Yes      Ultrasound used to identify targeted nerve, plexus, or vascular structure and placed a needle adjacent to it      Permanent Image entered into patiient's record      Abnormal pain on injection: No      Blood Aspirated: No      Paresthesias:  No    Bleeding at site: No      Bolus via:  Needle    Infusion Method:  Single Shot    Complications:  None  Assessment/Narrative:     Injection made incrementally with aspirations every (mL):  5     Patient tolerated well. Incremental aspiration every 5 mL. No paresthesia, no heme. Needle tip visualized throughout with appropriate spread of local anesthetic in fascial plane.

## 2020-07-22 NOTE — BRIEF OP NOTE
[facility]  Gynecology Oncology Brief Operative Note    Pre-operative diagnosis:   - Pelvic mass  - Tobacco use  - Hypertension  - Hyperlipidemia    Post-operative diagnosis:  - Same s/p procedure    Procedure: XL, STACEY, BSO, pelvic washing    Surgeon:   Sushma Camacho MD    Assistants:  Yoli Mandel MD, Gyn Onc Fellow  Rosaline Ortega MD, PGY4  Lesley Pingannie MS4    Anesthesia: General     Estimated blood loss: 150 mL  IV Fluids: 1500 mL crystalloid  UOP: 300 mL    Drains: Batista    Specimens:  ID Type Source Tests Collected by Time Destination   1 : Pelvic Washings Washings Pelvis CYTOLOGY NON GYN Sushma Camacho MD 7/22/2020  9:40 AM    A : Right Fallopian Tube and Ovary Tissue Fallopian Tube and Ovary, Right SURGICAL PATHOLOGY EXAM Sushma Camacho MD 7/22/2020  9:59 AM    B : Left Fallopian Tube and Ovary Tissue Fallopian Tube and Ovary, Left SURGICAL PATHOLOGY EXAM Sushma Camacho MD 7/22/2020 10:09 AM    C : Uterus and Cervix Tissue Uterus and Cervix SURGICAL PATHOLOGY EXAM Sushma Camacho MD 7/22/2020 10:37 AM      Findings:  Exam under anesthesia demonstrates two distinct pelvic masses, mass on right adnexa mobile, posterior mass fixed. Laparotomy revealed two ovarian masses, with right larger than left. Both ovaries with multicystic masses. Uterus with multiple small fibroids approximately 1cm in size. Otherwise normal appearing fallopian tubes, bowel, appendix. Liver and spleen palpated normal. Minor adhesions of the omentum to the anterior abdominal wall, otherwise no intraabdominal adhesions.    Complications: None apparent    Dispo: stable to PACU    Rosaline Ortega MD  OB/GYN Resident PGY4  Pager [phone]  07/22/2020 11:57 AM

## 2020-07-23 LAB
ANION GAP SERPL CALCULATED.3IONS-SCNC: 4 MMOL/L (ref 3–14)
BUN SERPL-MCNC: 10 MG/DL (ref 7–30)
CALCIUM SERPL-MCNC: 8.7 MG/DL (ref 8.5–10.1)
CHLORIDE SERPL-SCNC: 105 MMOL/L (ref 94–109)
CO2 SERPL-SCNC: 28 MMOL/L (ref 20–32)
COPATH REPORT: NORMAL
CREAT SERPL-MCNC: 0.65 MG/DL (ref 0.52–1.04)
ERYTHROCYTE [DISTWIDTH] IN BLOOD BY AUTOMATED COUNT: 13 % (ref 10–15)
GFR SERPL CREATININE-BSD FRML MDRD: >90 ML/MIN/{1.73_M2}
GLUCOSE SERPL-MCNC: 127 MG/DL (ref 70–99)
HCT VFR BLD AUTO: 36 % (ref 35–47)
HGB BLD-MCNC: 12 G/DL (ref 11.7–15.7)
MCH RBC QN AUTO: 30.5 PG (ref 26.5–33)
MCHC RBC AUTO-ENTMCNC: 33.3 G/DL (ref 31.5–36.5)
MCV RBC AUTO: 91 FL (ref 78–100)
PLATELET # BLD AUTO: 253 10E9/L (ref 150–450)
POTASSIUM SERPL-SCNC: 3.7 MMOL/L (ref 3.4–5.3)
RBC # BLD AUTO: 3.94 10E12/L (ref 3.8–5.2)
SODIUM SERPL-SCNC: 137 MMOL/L (ref 133–144)
WBC # BLD AUTO: 10.9 10E9/L (ref 4–11)

## 2020-07-23 PROCEDURE — 99406 BEHAV CHNG SMOKING 3-10 MIN: CPT

## 2020-07-23 PROCEDURE — 12000001 ZZH R&B MED SURG/OB UMMC

## 2020-07-23 PROCEDURE — 25000132 ZZH RX MED GY IP 250 OP 250 PS 637: Performed by: STUDENT IN AN ORGANIZED HEALTH CARE EDUCATION/TRAINING PROGRAM

## 2020-07-23 PROCEDURE — 25800030 ZZH RX IP 258 OP 636: Performed by: STUDENT IN AN ORGANIZED HEALTH CARE EDUCATION/TRAINING PROGRAM

## 2020-07-23 PROCEDURE — 99024 POSTOP FOLLOW-UP VISIT: CPT | Mod: GC | Performed by: OBSTETRICS & GYNECOLOGY

## 2020-07-23 PROCEDURE — 85027 COMPLETE CBC AUTOMATED: CPT | Performed by: STUDENT IN AN ORGANIZED HEALTH CARE EDUCATION/TRAINING PROGRAM

## 2020-07-23 PROCEDURE — 80048 BASIC METABOLIC PNL TOTAL CA: CPT | Performed by: STUDENT IN AN ORGANIZED HEALTH CARE EDUCATION/TRAINING PROGRAM

## 2020-07-23 PROCEDURE — 25000128 H RX IP 250 OP 636: Performed by: STUDENT IN AN ORGANIZED HEALTH CARE EDUCATION/TRAINING PROGRAM

## 2020-07-23 PROCEDURE — 36415 COLL VENOUS BLD VENIPUNCTURE: CPT | Performed by: STUDENT IN AN ORGANIZED HEALTH CARE EDUCATION/TRAINING PROGRAM

## 2020-07-23 PROCEDURE — 40000275 ZZH STATISTIC RCP TIME EA 10 MIN

## 2020-07-23 RX ORDER — ACETAMINOPHEN 325 MG/1
650 TABLET ORAL EVERY 6 HOURS
Qty: 24 TABLET | Refills: 0 | Status: SHIPPED | OUTPATIENT
Start: 2020-07-23 | End: 2020-07-26

## 2020-07-23 RX ORDER — LOSARTAN POTASSIUM 25 MG/1
25 TABLET ORAL EVERY MORNING
Status: DISCONTINUED | OUTPATIENT
Start: 2020-07-23 | End: 2020-07-24 | Stop reason: HOSPADM

## 2020-07-23 RX ORDER — OXYCODONE HYDROCHLORIDE 5 MG/1
5 TABLET ORAL EVERY 6 HOURS PRN
Qty: 12 TABLET | Refills: 0 | Status: SHIPPED | OUTPATIENT
Start: 2020-07-23

## 2020-07-23 RX ORDER — TRIAMTERENE/HYDROCHLOROTHIAZID 37.5-25 MG
1 TABLET ORAL EVERY MORNING
Status: DISCONTINUED | OUTPATIENT
Start: 2020-07-23 | End: 2020-07-24 | Stop reason: HOSPADM

## 2020-07-23 RX ORDER — AMOXICILLIN 250 MG
2 CAPSULE ORAL 2 TIMES DAILY PRN
Qty: 60 TABLET | Refills: 1 | Status: SHIPPED | OUTPATIENT
Start: 2020-07-23

## 2020-07-23 RX ORDER — IBUPROFEN 600 MG/1
600 TABLET, FILM COATED ORAL EVERY 6 HOURS PRN
Status: DISCONTINUED | OUTPATIENT
Start: 2020-07-23 | End: 2020-07-24 | Stop reason: HOSPADM

## 2020-07-23 RX ORDER — IBUPROFEN 600 MG/1
600 TABLET, FILM COATED ORAL EVERY 6 HOURS
Qty: 12 TABLET | Refills: 0 | Status: SHIPPED | OUTPATIENT
Start: 2020-07-23 | End: 2020-07-26

## 2020-07-23 RX ADMIN — ENOXAPARIN SODIUM 40 MG: 40 INJECTION SUBCUTANEOUS at 08:18

## 2020-07-23 RX ADMIN — SODIUM CHLORIDE, POTASSIUM CHLORIDE, SODIUM LACTATE AND CALCIUM CHLORIDE 250 ML: 600; 310; 30; 20 INJECTION, SOLUTION INTRAVENOUS at 00:13

## 2020-07-23 RX ADMIN — SIMVASTATIN 40 MG: 10 TABLET, FILM COATED ORAL at 21:39

## 2020-07-23 RX ADMIN — OXYCODONE HYDROCHLORIDE 5 MG: 5 TABLET ORAL at 20:40

## 2020-07-23 RX ADMIN — IBUPROFEN 600 MG: 600 TABLET ORAL at 16:15

## 2020-07-23 RX ADMIN — MAGNESIUM HYDROXIDE 15 ML: 400 SUSPENSION ORAL at 08:18

## 2020-07-23 RX ADMIN — SODIUM CHLORIDE, POTASSIUM CHLORIDE, SODIUM LACTATE AND CALCIUM CHLORIDE: 600; 310; 30; 20 INJECTION, SOLUTION INTRAVENOUS at 06:03

## 2020-07-23 RX ADMIN — ACETAMINOPHEN 650 MG: 325 TABLET, FILM COATED ORAL at 20:39

## 2020-07-23 RX ADMIN — KETOROLAC TROMETHAMINE 30 MG: 30 INJECTION, SOLUTION INTRAMUSCULAR at 00:00

## 2020-07-23 RX ADMIN — ACETAMINOPHEN 650 MG: 325 TABLET, FILM COATED ORAL at 06:03

## 2020-07-23 RX ADMIN — TRIAMTERENE AND HYDROCHLOROTHIAZIDE 1 TABLET: 37.5; 25 TABLET ORAL at 08:19

## 2020-07-23 RX ADMIN — KETOROLAC TROMETHAMINE 30 MG: 30 INJECTION, SOLUTION INTRAMUSCULAR at 06:04

## 2020-07-23 RX ADMIN — LOSARTAN POTASSIUM 25 MG: 25 TABLET, FILM COATED ORAL at 08:18

## 2020-07-23 RX ADMIN — ACETAMINOPHEN 650 MG: 325 TABLET, FILM COATED ORAL at 12:13

## 2020-07-23 ASSESSMENT — ACTIVITIES OF DAILY LIVING (ADL)
ADLS_ACUITY_SCORE: 10
ADLS_ACUITY_SCORE: 12
ADLS_ACUITY_SCORE: 10
ADLS_ACUITY_SCORE: 12

## 2020-07-23 ASSESSMENT — PAIN DESCRIPTION - DESCRIPTORS
DESCRIPTORS: SORE
DESCRIPTORS: SORE
DESCRIPTORS: SORE;ACHING
DESCRIPTORS: SORE
DESCRIPTORS: SORE

## 2020-07-23 ASSESSMENT — MIFFLIN-ST. JEOR: SCORE: 1459.01

## 2020-07-23 NOTE — PROGRESS NOTES
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 Passed spontaneous trial of void.   - Hemoglobin dropped appropriately and prophylactic Lovenox was started.  - Transitioned from IV ketorolac to oral ibuprofen. Discontinued IV Dilaudid.    Subjective: Patient is doing well this morning. Has abdominal pain that is well controlled with pain medication but would like to try something other than Oxycodone. Is eating small amounts of food and drinking water without nausea. Has been passing flatus, no BM. Hopes to go home today.     Objective:  Patient Vitals for the past 24 hrs:   BP Temp Temp src Pulse Heart Rate Resp SpO2 Weight   20 0720 (!) 157/94 98  F (36.7  C) Oral -- 62 16 93 % --   20 2243 122/69 98.1  F (36.7  C) Oral -- 74 16 90 % --   20 1535 136/75 98.3  F (36.8  C) Oral -- 67 16 93 % --   20 1114 (!) 145/82 98.7  F (37.1  C) Oral 75 -- 18 94 % --   20 1000 -- -- -- -- -- -- -- 90.9 kg (200 lb 6.4 oz)   20 0810 (!) 157/98 98.1  F (36.7  C) Oral 76 76 17 93 % --     GEN - NAD, sitting comfortably in bed  CV - RRR  PULM -  Breathing comfortably on RA.   ABD - soft, non-distended, appropriately tender  INC - c/d/i, bruising improved  EXT - no edema, non-tender    I/O (last 24h // since midnight)  I: PO: 600mL//not recorded; IV: 0//none recorded  O: UOP: 1975mL // not recorded    Assessment: Annamarie Neal is a 60 year old now POD#2 s/p XL, STACEY, BSO, pelvic washing. Doing well post-operatively, ready for discharge.    Plan:  Dz: Benign, likely serous cystadenoma on frozen pathology. Will follow up with Dr. Camacho on 745 for routine postoperative follow up and treatment planning pending on final pathology.  FEN: Regular diet.  Pain: Scheduled tylenol, Robaxin. PRN ibuprofen and oxycodone. S/p TAP.   Heme: No  symptoms of acute blood loss anemia or intraabdominal bleeding.  CV: HTN: on losartan, triamterene-HCTZ; HLD: on simvastatin  Pulm: Tobacco use: nicotine gum PRN. Discussed smoking cessation.  GI: Bowel regimen, antiemetics PRN  : S/p goodwin, voiding spontaneously.   ID: S/p pre-op antibiotics  Endo/Psych/Neuro/MSK: No issues  PPX: SCDs, IS, lovenox while in-patient  Dispo: Likely home today   Drains/Lines: PIV x1    Lesley Chavezmacario MS4    I was present with the medical student who participated in the service and in the documentation of this note.  I have verified the history and personally performed the physical exam and medical decision making, and have verified the content of the note, which accurately reflect my assessment of the patient and the plan of care.     Rosaline Ortega MD  OBGYN Resident PGY4  07/24/2020 7:55 AM      Provider Disclosure:   I agree with above History, Review of Systems, Physical exam and Plan. I have reviewed the content of the documentation and have edited it as needed. I have seen and personally performed the services documented here and the documentation accurately represents those services and the decisions I have made.     Electronically signed by:   Anibal Graham MD   Gynecologic Oncology   [facility]

## 2020-07-23 NOTE — PROGRESS NOTES
Admitted/transferred from: PACU  2 RN full   skin assessment completed by Jose E Escobar, RN and Socorro Cadena RN.  Skin assessment finding: issues found midline surgical incision   Interventions/actions: other N/A     Will continue to monitor.

## 2020-07-23 NOTE — PLAN OF CARE
POD #1. Hypertensive but not within notify parameters. AOVSS. Assist x1. Regular diet, only taking sips of water. Encouraged to drink more. Pt urine output low at beginning of shift. Provider called staff to give 250ml bolus of LR and to irrigate goodwin and recheck in 2 hours. Pt urine output has improved since. Will continue to monitor. Pain managed with scheduled tylenol and toradol. Denies nausea. Abdominal midline incision bruising otherwise C/D/I and MICHEAL. Abdominal binder in place for comfort. Pt resting between cares. Continue POC.

## 2020-07-23 NOTE — PLAN OF CARE
POD # 1 XL STACEY BSO, pelvic washing    Alert and oriented, up ad viktor. Ambulated x 2 thi shift. Pain managed with scheduled po tylenol. OVSS, afebrile. Encouraged use of IS when awake. PIV-SL. Voiding spontaneously with adequate urine volume after goodwin was removed this am. Mid incision CDI/MICHEAL. Audible bowel sounds, not passing gas yet. PLAN: Continue with the care plan.

## 2020-07-23 NOTE — CONSULTS
"Smoking Cessation Consult   2020    Patient: Annamarie Neal      :  1960                    MRN:[id number]      Adnexal mass [N94.89] @HX    Asked patient if she would be interested in sharing about her tobacco use and in learning about more options and resources for quitting, staying quit, and in developing a relapse prevention plan. Patient declined smoking cessation counseling at this time. Stated she has a PRN 4mg nicotine gum available, haven't used yet. Stated she has quit in the past for 6 months without any help--stating: \"I can do it again on my own.\"     Patient asked if she is currently experiencing symptoms of withdrawal such as sleeplessness, headache, anxiety, irritability, and intense cravings to smoke. Patient stated she is not.     Patient offered and accepted the educational workbook, \"Quitting for Good with Treatment and Support\". Discussed health risks of continued smoking.     Darrin Mckeon, RRT, CTTS  Chronic Pulmonary Disease Specialist  Cardiopulmonary Services   Office: [phone]  Pager: [phone]          "

## 2020-07-23 NOTE — PLAN OF CARE
MARIE. Pt arrived  from PACU @ 1700. Pain controlled with tylenol and toradol. MIV infusing through PIV. Tolerated dangle and standing at bedside. Lester patent with adequate UOP. JEFF WNL. Cont. POC.

## 2020-07-24 ENCOUNTER — DOCUMENTATION ONLY (OUTPATIENT)
Dept: ONCOLOGY | Facility: CLINIC | Age: 60
End: 2020-07-24

## 2020-07-24 VITALS
DIASTOLIC BLOOD PRESSURE: 73 MMHG | HEART RATE: 75 BPM | TEMPERATURE: 98 F | WEIGHT: 200.4 LBS | RESPIRATION RATE: 16 BRPM | HEIGHT: 64 IN | OXYGEN SATURATION: 94 % | BODY MASS INDEX: 34.21 KG/M2 | SYSTOLIC BLOOD PRESSURE: 143 MMHG

## 2020-07-24 PROCEDURE — 99024 POSTOP FOLLOW-UP VISIT: CPT | Mod: GC | Performed by: OBSTETRICS & GYNECOLOGY

## 2020-07-24 PROCEDURE — 25000132 ZZH RX MED GY IP 250 OP 250 PS 637: Performed by: STUDENT IN AN ORGANIZED HEALTH CARE EDUCATION/TRAINING PROGRAM

## 2020-07-24 PROCEDURE — 25000128 H RX IP 250 OP 636: Performed by: STUDENT IN AN ORGANIZED HEALTH CARE EDUCATION/TRAINING PROGRAM

## 2020-07-24 RX ORDER — ACETAMINOPHEN 325 MG/1
975 TABLET ORAL EVERY 6 HOURS
Status: DISCONTINUED | OUTPATIENT
Start: 2020-07-24 | End: 2020-07-24 | Stop reason: HOSPADM

## 2020-07-24 RX ORDER — CEFTRIAXONE 1 G/1
1 INJECTION, POWDER, FOR SOLUTION INTRAMUSCULAR; INTRAVENOUS EVERY 24 HOURS
Status: DISCONTINUED | OUTPATIENT
Start: 2020-07-24 | End: 2020-07-24

## 2020-07-24 RX ORDER — METHOCARBAMOL 500 MG/1
500 TABLET, FILM COATED ORAL 4 TIMES DAILY
Qty: 20 TABLET | Refills: 0 | Status: SHIPPED | OUTPATIENT
Start: 2020-07-24

## 2020-07-24 RX ORDER — METHOCARBAMOL 500 MG/1
500 TABLET, FILM COATED ORAL 4 TIMES DAILY
Status: DISCONTINUED | OUTPATIENT
Start: 2020-07-24 | End: 2020-07-24 | Stop reason: HOSPADM

## 2020-07-24 RX ADMIN — METHOCARBAMOL 500 MG: 500 TABLET, FILM COATED ORAL at 08:38

## 2020-07-24 RX ADMIN — TRIAMTERENE AND HYDROCHLOROTHIAZIDE 1 TABLET: 37.5; 25 TABLET ORAL at 08:38

## 2020-07-24 RX ADMIN — ACETAMINOPHEN 975 MG: 325 TABLET, FILM COATED ORAL at 13:15

## 2020-07-24 RX ADMIN — ENOXAPARIN SODIUM 40 MG: 40 INJECTION SUBCUTANEOUS at 08:38

## 2020-07-24 RX ADMIN — IBUPROFEN 600 MG: 600 TABLET ORAL at 13:33

## 2020-07-24 RX ADMIN — LOSARTAN POTASSIUM 25 MG: 25 TABLET, FILM COATED ORAL at 08:38

## 2020-07-24 RX ADMIN — METHOCARBAMOL 500 MG: 500 TABLET, FILM COATED ORAL at 13:15

## 2020-07-24 RX ADMIN — ACETAMINOPHEN 975 MG: 325 TABLET, FILM COATED ORAL at 06:36

## 2020-07-24 ASSESSMENT — ACTIVITIES OF DAILY LIVING (ADL)
ADLS_ACUITY_SCORE: 10

## 2020-07-24 ASSESSMENT — PAIN DESCRIPTION - DESCRIPTORS
DESCRIPTORS: ACHING;BURNING
DESCRIPTORS: ACHING;SORE
DESCRIPTORS: ACHING;SORE

## 2020-07-24 NOTE — PROGRESS NOTES
This writer received an email regarding a short term disability claim for patient.    Confirmed fax number and address, both were incorrect.  Correct address, phone and fax number were given.    Forms to be faxed within the next couple days.    Shayna Roman CMA ([facility])

## 2020-07-24 NOTE — PLAN OF CARE
AVSS. Pain managed with Tylenol. On a regular diet, denied nausea. Abdominal incision with bruising, otherwise CDI with dermabond. Abdominal binder on. Voiding, not saving. Passing gas, no post-op BM yet. Patient ambulating independently.

## 2020-07-24 NOTE — PLAN OF CARE
Pt is alert and oriented x4.B/P runs high; but within parameter. Pt did not eat breakfast stating that she did not have appetite.Pt ate lunch fairly and tolerated. Pt reported that she is passing gas; but denied BM.Pt reported new left lower back pain; Md came and assessed pt. Md recommended pt to take ibuprofen; pt got relief after Ibuprofen.Up independent to bathroom; voided adequate amount.  Discharge  Discharge instruction was revised with pt and discharge paper work given to pt.Pt stated understanding of discharge instruction. PIV removed; tip of catheter is intact.Pt to continue to take medication and to continue to follow up with clinic appointments per discharge instruction.Pt to pick discharge meds from discharge pharmacy. Son to give pt ride home.

## 2020-07-24 NOTE — PLAN OF CARE
POD# 1 s/p Ex Lap, BSO,STACEY. Pt reports good pain control with current regimen, denies nausea, good PO intake. Pt ate 100% of her dinner meal. Pt reports positive flatus, no BM. She's voiding and ambulating without difficulty, abdominal binder in place, PIV patent. Encouraged to use the IS, PCDs in place, call light in reach.

## 2020-07-28 LAB — COPATH REPORT: NORMAL

## 2020-07-29 ENCOUNTER — DOCUMENTATION ONLY (OUTPATIENT)
Dept: ONCOLOGY | Facility: CLINIC | Age: 60
End: 2020-07-29

## 2020-07-29 NOTE — PROGRESS NOTES
STD paperwork received via fax from [employer]. Will be placed in provider folder for signature upon completion.     Fax: [phone]  Claim: [id number]      Dayanara Juarez CMA

## 2020-08-03 ENCOUNTER — PATIENT OUTREACH (OUTPATIENT)
Dept: ONCOLOGY | Facility: CLINIC | Age: 60
End: 2020-08-03

## 2020-08-06 NOTE — PROGRESS NOTES
Forms approved and signed by provider. Form faxed as requested in previous note. Copy mailed to patient and sent for scanning into patient chart. Successful transmission verified via Right[employer]ax.     Malu Chairez CMA ([facility])

## 2020-08-11 ENCOUNTER — DOCUMENTATION ONLY (OUTPATIENT)
Dept: ONCOLOGY | Facility: CLINIC | Age: 60
End: 2020-08-11

## 2020-08-11 NOTE — PROGRESS NOTES
STD Attending Physicians Statement forms received via fax from [employer].      Forms to be completed and put in folder for provider to approve.    Fax #:  [phone]  Claim: [id number]    Dayanara Diaz CMA

## 2020-08-13 ENCOUNTER — OFFICE VISIT (OUTPATIENT)
Dept: ONCOLOGY | Facility: CLINIC | Age: 60
End: 2020-08-13
Attending: OBSTETRICS & GYNECOLOGY
Payer: COMMERCIAL

## 2020-08-13 VITALS
WEIGHT: 195.4 LBS | BODY MASS INDEX: 33.54 KG/M2 | SYSTOLIC BLOOD PRESSURE: 135 MMHG | RESPIRATION RATE: 16 BRPM | OXYGEN SATURATION: 94 % | TEMPERATURE: 97.2 F | DIASTOLIC BLOOD PRESSURE: 88 MMHG | HEART RATE: 80 BPM

## 2020-08-13 DIAGNOSIS — D27.0: Primary | ICD-10-CM

## 2020-08-13 PROCEDURE — 99024 POSTOP FOLLOW-UP VISIT: CPT | Mod: ZP | Performed by: OBSTETRICS & GYNECOLOGY

## 2020-08-13 PROCEDURE — 40001009 ZZH VIDEO/TELEPHONE VISIT; NO CHARGE

## 2020-08-13 ASSESSMENT — PAIN SCALES - GENERAL: PAINLEVEL: NO PAIN (0)

## 2020-08-13 NOTE — LETTER
2020         RE: Annamarie Neal  [address] [address]        Dear Colleague,    Thank you for referring your patient, Annamarie Neal, to the [facility]. Please see a copy of my visit note below.    GYNECOLOGIC  ONCOLOGY CLINIC NOTE    Referring provider:    Referred Self, MD  No address on file   RE: Annamarie Neal  : 1960  ANNAMARIA: 20    CC:  Ovarian cystadenofibroma    HPI: Ms Annamarie Neal is a 60 year old presenting post recent surgery.    Since surgery she reports good pain control, not using any pain medications. No concern with her incision. Her energy is slowly improving.    Work-up to date     20 Pelvic US   Uterus: measures 7.6 x 2.9 x 5.1 cm. No masses appreciated.  Endometrial stripe:  0.32 cm    Right ovary:  17.8 x 7.2 x 10.9 cm. Multiple large complex solid and cystic masses with internal septations and echogenic debris is demonstrated on same-day CT extending beyond visualization into the right upper abdomen. Normal low-resistance arterial waveforms.    Left ovary:  8.3 x 5.1 x 5.4 cm. Multiple large complex solid and cystic masses with internal septations and echogenic contents as demonstrated on same-day CT. Normal low-resistance arterial waveforms.    Urinary Bladder:  decompressed and not well visualized.    20 CT scan  IMPRESSION  IMPRESSION:   1. Large bilateral complex adnexal masses with multiple internal septations and nodularity measuring up to 13.4 cm. These demonstrate intermediate fluid signal which may represent hemorrhagic vs proteinaceous contents. The complex right adnexal mass extends into the right upper quadrant abutting multiple loops of bowel. Findings concerning for neoplasm although there is no distinct iodine uptake appreciated. Recommend ob/gyn consult, and consider MRI.    2. Scattered sub-4 mm posterior bibasilar pleural-based ground glass nodules.    3. Additional incidental findings as described in the  body of the report including a left adrenal adenoma.  20 CEA  0.6;  50.8    20 Cytology- Negative  20 she underwent exploratory laparotomy, total abdominal hysterectomy, bilateral salpingo-oophorectomy, pelvic washings.   Pathology:  FINAL DIAGNOSIS:   A. FALLOPIAN TUBE AND OVARY, RIGHT, SALPINGO-OOPHORECTOMY:   - Right ovary with serous cystadenofibroma   - Unremarkable right fallopian tube   - Negative for malignancy     B. FALLOPIAN TUBE AND OVARY, LEFT, SALPINGO-OOPHORECTOMY:   - Left ovary with serous cystadenofibroma   - Unremarkable left fallopian tube   - Negative for malignancy     C. UTERUS AND CERVIX, HYSTERECTOMY:   - Inactive endometrium, negative for hyperplasia and atypia   - Myometrium with benign leiomyomata   - Cervix with endometriosis, negative for dysplasia   - Negative for malignancy       OBGYN history and Health Maintenance:    Last Pap Smear: 2019  Last Mammogram: 2019  Last Colonoscopy:  Negative 3/20 fecal occult yearly    Review of Systems:  Systemic:No weight changes.    Skin : No skin changes or new lesions.   Eye : No changes in vision.   Pulmonary: No cough or SOB.   Cardiovascular: No CP or palpitations  Gastrointestinal : No diarrhea, constipation, surgical abdominal pain. Bowel habits normal.   Genitourinary: No dysuria, urgency or bleeding  Psychiatric: No depression or anxiety  Hematologic : No palpable lymph nodes.   Endocrine : No hot flashes. No heat/cold intolerance.      Neurological: No headaches, no numbness.     Past Medical History:   Diagnosis Date     Adnexal mass 2020     Dyslipidemia, goal LDL below 130 10/2/2013     GERD (gastroesophageal reflux disease)      Hypertension 2020     Prediabetes 3/22/2019     Smoker 2011     Past Surgical History:   Procedure Laterality Date     HYSTERECTOMY TOTAL ABD, ARACELI SALPINGO-OOPHORECTOMY, NODE DISSECTION, TUMOR DEBULKING, COMBINED Bilateral 2020    Procedure: EXPLORATORY  LAPAROTOMY, BILATERAL SALPINGO-OOPHORECTOMY, TOTAL ABDOMINAL HYSTERECTOMY, PELVIC WASHINGS;  Surgeon: Sushma Camacho MD;  Location: [facility] OR     SURGICAL PATHOLOGY EXAM       TONSILLECTOMY           Current Outpatient Medications   Medication Sig Dispense Refill     albuterol (PROAIR HFA/PROVENTIL HFA/VENTOLIN HFA) 108 (90 Base) MCG/ACT inhaler Inhale 1-2 puffs into the lungs every 4 hours as needed       Ascorbic Acid (VITAMIN C PO) Take 1 tablet by mouth daily Last dose 7.17.2020       CALCIUM-VITAMIN D PO Take 1 tablet by mouth daily Last dose 7.17.2020       losartan (COZAAR) 25 MG tablet Take 25 mg by mouth every morning        nicotine polacrilex (NICORETTE) 4 MG gum Take 4 mg by mouth as needed (Pt last dose 7.15.2020)        simvastatin (ZOCOR) 40 MG tablet Take 40 mg by mouth At Bedtime        triamterene-HCTZ (MAXZIDE-25) 37.5-25 MG tablet Take 1 tablet by mouth every morning        vitamin (B COMPLEX-C) tablet Take 1 tablet by mouth daily Last dose 7.17.2020       methocarbamol (ROBAXIN) 500 MG tablet Take 1 tablet (500 mg) by mouth 4 times daily (Patient not taking: Reported on 8/13/2020) 20 tablet 0     naloxone (NARCAN) 4 MG/0.1ML nasal spray After removing nasal spray from box, gently insert the tip of the nozzle into either nostril. Press the plunger firmly to give dose; remove device from nose. If no reaction in 2-3 minutes, give a second dose.       nicotine polacrilex (NICORETTE) 4 MG gum Take 1 each (4 mg) by mouth every hour as needed for smoking cessation (Pt last dose 7.15.2020) (Patient not taking: Reported on 8/13/2020) 50 each 3     oxyCODONE (ROXICODONE) 5 MG tablet Take 1 tablet (5 mg) by mouth every 6 hours as needed for breakthrough pain (Patient not taking: Reported on 8/13/2020) 12 tablet 0     senna-docusate (SENOKOT-S/PERICOLACE) 8.6-50 MG tablet Take 2 tablets by mouth 2 times daily as needed for constipation (Patient not taking: Reported on 8/13/2020) 60 tablet 1         Allergies    Allergen Reactions     Tylenol With Codeine [Acetaminophen-Codeine] Nausea and Vomiting     Lisinopril      Other reaction(s): Cough       Social History:  Social History     Tobacco Use     Smoking status: Current Every Day Smoker     Packs/day: 0.50     Years: 23.00     Pack years: 11.50     Types: Cigarettes     Smokeless tobacco: Never Used     Tobacco comment: Now 5 cigs daily   Substance Use Topics     Alcohol use: Yes     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    General: Alert and oriented, no acute distress  Psych: Mood stable  GI: No distention. No masses. No hernia.   Incision: intact, no erythema    Assessment:    Annamarie Neal is a 60 year old woman post operative from bilateral salping-oophorectomy and total abdominal hysterctomy for bilateral serous cystadenofibroma. No evidence of malignancy.    Pathology reviewed with patient.    Recovering well without complications.    Follow up with PCP         Sushma Camacho M.D., MPH,  F.A.C.O.G.  Professor  Department of Ob/Gyn and Women's Health  Division of Gynecologic Oncology  [facility]  [phone]

## 2020-08-13 NOTE — PROGRESS NOTES
GYNECOLOGIC  ONCOLOGY CLINIC NOTE    Referring provider:    Referred Self, MD  No address on file   RE: Annamarie Neal  : 1960  ANNAMARIA: 20    CC:  Ovarian cystadenofibroma    HPI: Ms Annamarie Neal is a 60 year old presenting post recent surgery.    Since surgery she reports good pain control, not using any pain medications. No concern with her incision. Her energy is slowly improving.    Work-up to date     20 Pelvic US   Uterus: measures 7.6 x 2.9 x 5.1 cm. No masses appreciated.  Endometrial stripe:  0.32 cm    Right ovary:  17.8 x 7.2 x 10.9 cm. Multiple large complex solid and cystic masses with internal septations and echogenic debris is demonstrated on same-day CT extending beyond visualization into the right upper abdomen. Normal low-resistance arterial waveforms.    Left ovary:  8.3 x 5.1 x 5.4 cm. Multiple large complex solid and cystic masses with internal septations and echogenic contents as demonstrated on same-day CT. Normal low-resistance arterial waveforms.    Urinary Bladder:  decompressed and not well visualized.    20 CT scan  IMPRESSION  IMPRESSION:   1. Large bilateral complex adnexal masses with multiple internal septations and nodularity measuring up to 13.4 cm. These demonstrate intermediate fluid signal which may represent hemorrhagic vs proteinaceous contents. The complex right adnexal mass extends into the right upper quadrant abutting multiple loops of bowel. Findings concerning for neoplasm although there is no distinct iodine uptake appreciated. Recommend ob/gyn consult, and consider MRI.    2. Scattered sub-4 mm posterior bibasilar pleural-based ground glass nodules.    3. Additional incidental findings as described in the body of the report including a left adrenal adenoma.  20 CEA  0.6;  50.8    20 Cytology- Negative  20 she underwent exploratory laparotomy, total abdominal hysterectomy, bilateral salpingo-oophorectomy, pelvic washings.    Pathology:  FINAL DIAGNOSIS:   A. FALLOPIAN TUBE AND OVARY, RIGHT, SALPINGO-OOPHORECTOMY:   - Right ovary with serous cystadenofibroma   - Unremarkable right fallopian tube   - Negative for malignancy     B. FALLOPIAN TUBE AND OVARY, LEFT, SALPINGO-OOPHORECTOMY:   - Left ovary with serous cystadenofibroma   - Unremarkable left fallopian tube   - Negative for malignancy     C. UTERUS AND CERVIX, HYSTERECTOMY:   - Inactive endometrium, negative for hyperplasia and atypia   - Myometrium with benign leiomyomata   - Cervix with endometriosis, negative for dysplasia   - Negative for malignancy       OBGYN history and Health Maintenance:    Last Pap Smear: 2019  Last Mammogram: 2019  Last Colonoscopy:  Negative 3/20 fecal occult yearly    Review of Systems:  Systemic:No weight changes.    Skin : No skin changes or new lesions.   Eye : No changes in vision.   Pulmonary: No cough or SOB.   Cardiovascular: No CP or palpitations  Gastrointestinal : No diarrhea, constipation, surgical abdominal pain. Bowel habits normal.   Genitourinary: No dysuria, urgency or bleeding  Psychiatric: No depression or anxiety  Hematologic : No palpable lymph nodes.   Endocrine : No hot flashes. No heat/cold intolerance.      Neurological: No headaches, no numbness.     Past Medical History:   Diagnosis Date     Adnexal mass 2020     Dyslipidemia, goal LDL below 130 10/2/2013     GERD (gastroesophageal reflux disease)      Hypertension 2020     Prediabetes 3/22/2019     Smoker 2011     Past Surgical History:   Procedure Laterality Date     HYSTERECTOMY TOTAL ABD, ARACELI SALPINGO-OOPHORECTOMY, NODE DISSECTION, TUMOR DEBULKING, COMBINED Bilateral 2020    Procedure: EXPLORATORY LAPAROTOMY, BILATERAL SALPINGO-OOPHORECTOMY, TOTAL ABDOMINAL HYSTERECTOMY, PELVIC WASHINGS;  Surgeon: Sushma Camacho MD;  Location: [facility] OR     SURGICAL PATHOLOGY EXAM       TONSILLECTOMY           Current Outpatient Medications   Medication  Sig Dispense Refill     albuterol (PROAIR HFA/PROVENTIL HFA/VENTOLIN HFA) 108 (90 Base) MCG/ACT inhaler Inhale 1-2 puffs into the lungs every 4 hours as needed       Ascorbic Acid (VITAMIN C PO) Take 1 tablet by mouth daily Last dose 7.17.2020       CALCIUM-VITAMIN D PO Take 1 tablet by mouth daily Last dose 7.17.2020       losartan (COZAAR) 25 MG tablet Take 25 mg by mouth every morning        nicotine polacrilex (NICORETTE) 4 MG gum Take 4 mg by mouth as needed (Pt last dose 7.15.2020)        simvastatin (ZOCOR) 40 MG tablet Take 40 mg by mouth At Bedtime        triamterene-HCTZ (MAXZIDE-25) 37.5-25 MG tablet Take 1 tablet by mouth every morning        vitamin (B COMPLEX-C) tablet Take 1 tablet by mouth daily Last dose 7.17.2020       methocarbamol (ROBAXIN) 500 MG tablet Take 1 tablet (500 mg) by mouth 4 times daily (Patient not taking: Reported on 8/13/2020) 20 tablet 0     naloxone (NARCAN) 4 MG/0.1ML nasal spray After removing nasal spray from box, gently insert the tip of the nozzle into either nostril. Press the plunger firmly to give dose; remove device from nose. If no reaction in 2-3 minutes, give a second dose.       nicotine polacrilex (NICORETTE) 4 MG gum Take 1 each (4 mg) by mouth every hour as needed for smoking cessation (Pt last dose 7.15.2020) (Patient not taking: Reported on 8/13/2020) 50 each 3     oxyCODONE (ROXICODONE) 5 MG tablet Take 1 tablet (5 mg) by mouth every 6 hours as needed for breakthrough pain (Patient not taking: Reported on 8/13/2020) 12 tablet 0     senna-docusate (SENOKOT-S/PERICOLACE) 8.6-50 MG tablet Take 2 tablets by mouth 2 times daily as needed for constipation (Patient not taking: Reported on 8/13/2020) 60 tablet 1         Allergies   Allergen Reactions     Tylenol With Codeine [Acetaminophen-Codeine] Nausea and Vomiting     Lisinopril      Other reaction(s): Cough       Social History:  Social History     Tobacco Use     Smoking status: Current Every Day Smoker      Packs/day: 0.50     Years: 23.00     Pack years: 11.50     Types: Cigarettes     Smokeless tobacco: Never Used     Tobacco comment: Now 5 cigs daily   Substance Use Topics     Alcohol use: Yes     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    General: Alert and oriented, no acute distress  Psych: Mood stable  GI: No distention. No masses. No hernia.   Incision: intact, no erythema    Assessment:    Annamarie Neal is a 60 year old woman post operative from bilateral salping-oophorectomy and total abdominal hysterctomy for bilateral serous cystadenofibroma. No evidence of malignancy.    Pathology reviewed with patient.    Recovering well without complications.    Follow up with PCP         Sushma Camacho M.D., MPH,  F.A.C.O.G.  Professor  Department of Ob/Gyn and Women's Health  Division of Gynecologic Oncology  [facility]/[employer] [place]  [phone]

## 2020-08-13 NOTE — PATIENT INSTRUCTIONS
No further follow up needed in gyn oncology  Plan to continue regular screening visits with PCP and gynecology

## 2020-08-17 NOTE — PROGRESS NOTES
Attending physicians forms completed and faxed on 7/29/20.  New forms were a duplicate of forms already sent.  Encounter closed.     Dayanara Diaz, CMA

## 2020-09-08 ENCOUNTER — DOCUMENTATION ONLY (OUTPATIENT)
Dept: ONCOLOGY | Facility: CLINIC | Age: 60
End: 2020-09-08

## 2020-09-08 NOTE — PROGRESS NOTES
STD forms received via fax from [facility].      Forms to be completed and put in folder for provider to approve.    Fax #:  [phone]  Claim: [id number]    Dayanara Diaz, CMA

## 2020-09-10 NOTE — PROGRESS NOTES
Per office visit note, pt was off for 6 weeks post op.  There have been no complications noted from surgery.  Pt does not need to follow up with gyn-onc, per Dr Camacho at 08/13/2020 office visit.    [employer] was called to advise of this, representative did request the form be completed and faxed in.    STD forms filled out and put in providers folder for review and signature.      Shayna Roman CMA ([facility])

## 2020-09-17 NOTE — PROGRESS NOTES
STD paperwork completed, checked for accuracy, signed and faxed to [employer] @ [phone]. A copy was made, sent to scanning and original mailed to patient at home address.    Successful transmission verified in Right Fax.      Dayanara Diaz, CMA

## 2021-01-10 ENCOUNTER — HEALTH MAINTENANCE LETTER (OUTPATIENT)
Age: 61
End: 2021-01-10

## 2021-10-23 ENCOUNTER — HEALTH MAINTENANCE LETTER (OUTPATIENT)
Age: 61
End: 2021-10-23

## 2022-02-12 ENCOUNTER — HEALTH MAINTENANCE LETTER (OUTPATIENT)
Age: 62
End: 2022-02-12

## 2022-10-10 ENCOUNTER — HEALTH MAINTENANCE LETTER (OUTPATIENT)
Age: 62
End: 2022-10-10

## 2023-02-18 ENCOUNTER — HEALTH MAINTENANCE LETTER (OUTPATIENT)
Age: 63
End: 2023-02-18

## 2024-03-16 ENCOUNTER — HEALTH MAINTENANCE LETTER (OUTPATIENT)
Age: 64
End: 2024-03-16

## (undated) DEVICE — ESU PENCIL SMOKE EVAC W/ROCKER SWITCH 0703-047-000

## (undated) DEVICE — SUCTION SLEEVE NEPTUNE 2 165MM 0703-005-165

## (undated) DEVICE — LINEN TOWEL PACK X6 WHITE 5487

## (undated) DEVICE — BLADE CLIPPER SGL USE 9680

## (undated) DEVICE — LINEN TOWEL PACK X30 5481

## (undated) DEVICE — SUCTION MANIFOLD DORNOCH ULTRA CART UL-CL500

## (undated) DEVICE — DRAPE LEGGINGS CLEAR 8430

## (undated) DEVICE — GLOVE PROTEXIS BLUE W/NEU-THERA 6.5  2D73EB65

## (undated) DEVICE — PAD PERI INDIV WRAP 11" 2022A

## (undated) DEVICE — SOL NACL 0.9% IRRIG 1000ML BOTTLE 2F7124

## (undated) DEVICE — SU PDS II 0 TP-1 60" Z991G

## (undated) DEVICE — TUBING SMOKE EVAC 1CMX3M SEA3710

## (undated) DEVICE — WIPES FOLEY CARE SURESTEP PROVON DFC100

## (undated) DEVICE — SU VICRYL 2-0 TIE 54" J615H

## (undated) DEVICE — PREP TECHNI-CARE CHLOROXYLENOL 3% 4OZ BOTTLE C222-4ZWO

## (undated) DEVICE — CLOSURE SYS SKIN PREMIERPRO EXOFINFUSION 4X60CM 3473

## (undated) DEVICE — GLOVE PROTEXIS MICRO 6.0  2D73PM60

## (undated) DEVICE — WIPE PREMOIST CLEANSING WASHCLOTHS 7988

## (undated) DEVICE — SU VICRYL 2-0 CT-2 27" J333H

## (undated) DEVICE — ADH SKIN CLOSURE PREMIERPRO EXOFIN 1.0ML 3470

## (undated) DEVICE — SOL WATER IRRIG 1000ML BOTTLE 2F7114

## (undated) DEVICE — PREP CHLORAPREP 26ML TINTED ORANGE  260815

## (undated) DEVICE — LINEN GOWN XLG 5407

## (undated) DEVICE — PACK AB HYST II

## (undated) DEVICE — PANTIES MESH LG/XLG 2PK 706M2

## (undated) DEVICE — TUBING SMOKE EVAC ATTACHMENT E3590

## (undated) RX ORDER — FENTANYL CITRATE-0.9 % NACL/PF 10 MCG/ML
PLASTIC BAG, INJECTION (ML) INTRAVENOUS
Status: DISPENSED
Start: 2020-07-22

## (undated) RX ORDER — HYDROMORPHONE HYDROCHLORIDE 1 MG/ML
INJECTION, SOLUTION INTRAMUSCULAR; INTRAVENOUS; SUBCUTANEOUS
Status: DISPENSED
Start: 2020-07-22

## (undated) RX ORDER — FENTANYL CITRATE 50 UG/ML
INJECTION, SOLUTION INTRAMUSCULAR; INTRAVENOUS
Status: DISPENSED
Start: 2020-07-22

## (undated) RX ORDER — DEXAMETHASONE SODIUM PHOSPHATE 4 MG/ML
INJECTION, SOLUTION INTRA-ARTICULAR; INTRALESIONAL; INTRAMUSCULAR; INTRAVENOUS; SOFT TISSUE
Status: DISPENSED
Start: 2020-07-22

## (undated) RX ORDER — CEFAZOLIN SODIUM 2 G/100ML
INJECTION, SOLUTION INTRAVENOUS
Status: DISPENSED
Start: 2020-07-22

## (undated) RX ORDER — PROPOFOL 10 MG/ML
INJECTION, EMULSION INTRAVENOUS
Status: DISPENSED
Start: 2020-07-22

## (undated) RX ORDER — EPHEDRINE SULFATE 50 MG/ML
INJECTION, SOLUTION INTRAMUSCULAR; INTRAVENOUS; SUBCUTANEOUS
Status: DISPENSED
Start: 2020-07-22

## (undated) RX ORDER — ONDANSETRON 2 MG/ML
INJECTION INTRAMUSCULAR; INTRAVENOUS
Status: DISPENSED
Start: 2020-07-22

## (undated) RX ORDER — ACETAMINOPHEN 325 MG/1
TABLET ORAL
Status: DISPENSED
Start: 2020-07-22

## (undated) RX ORDER — GLYCOPYRROLATE 0.2 MG/ML
INJECTION, SOLUTION INTRAMUSCULAR; INTRAVENOUS
Status: DISPENSED
Start: 2020-07-22

## (undated) RX ORDER — LIDOCAINE HYDROCHLORIDE 20 MG/ML
INJECTION, SOLUTION EPIDURAL; INFILTRATION; INTRACAUDAL; PERINEURAL
Status: DISPENSED
Start: 2020-07-22

## (undated) RX ORDER — CEFAZOLIN SODIUM 1 G/3ML
INJECTION, POWDER, FOR SOLUTION INTRAMUSCULAR; INTRAVENOUS
Status: DISPENSED
Start: 2020-07-22

## (undated) RX ORDER — SODIUM CHLORIDE, SODIUM LACTATE, POTASSIUM CHLORIDE, CALCIUM CHLORIDE 600; 310; 30; 20 MG/100ML; MG/100ML; MG/100ML; MG/100ML
INJECTION, SOLUTION INTRAVENOUS
Status: DISPENSED
Start: 2020-07-22